# Patient Record
Sex: FEMALE | Race: WHITE
[De-identification: names, ages, dates, MRNs, and addresses within clinical notes are randomized per-mention and may not be internally consistent; named-entity substitution may affect disease eponyms.]

---

## 2018-03-11 NOTE — PD
HPI


Chief Complaint:  General Weakness


Time Seen by Provider:  14:46


Travel History


International Travel<30 days:  No


Contact w/Intl Traveler<30days:  No


Traveled to known affect area:  No





History of Present Illness


HPI


58 YO F with PMH of arrhythmia presents to the ED for evaluation of near 

syncopal episode ~2 hours before presentation. The patient states that she was 

walking, carrying a few small bonsai trees, when she felt dizzy, saw spots and 

"blacked out." A friend was with her, she did not fall or lose consciousness. 

ON presentation she denies headache, vision changes, CP, palpitations, SOB, 

abdominal pain, N/V, weakness of the extremities. She states that she felt as 

if her heart rate was "a little fast" this AM, so she took 2 doses of atenolol. 

States that she was recently treated for a UTI. She lives in [place] and 

is visiting for [place].





PFSH


Past Medical History


Arthritis:  Yes (RA)


Anxiety:  Yes


Heart Rhythm Problems:  Yes (TACHYCARDIA)


Cardiac Catheterization:  Yes


Cardiovascular Problems:  Yes (OCCASIONAL "RE-ENTRY TACHYCARDIA"; SAKSHI-

PARKINSON-WHITE SYNDROME)


Chest Pain:  Yes


Diminished Hearing:  No


Gastrointestinal Disorders:  Yes (FATTY LIVER)


Genitourinary:  Yes (PYELONEPHRITIS)


Musculoskeletal:  Yes (MS AND RA)


Psychiatric:  Yes (schizophrenia)


Pneumonia:  Yes


Schizophrenia:  Yes


Menopausal:  Yes


:  8


Para:  6


Miscarriage:  2





Past Surgical History


Appendectomy:  Yes


 Section:  Yes


Gynecologic Surgery:  Yes (C/SECTION X 1)





Social History


Alcohol Use:  No


Tobacco Use:  No


Substance Use:  No





Allergies-Medications


(Allergen,Severity, Reaction):  


Coded Allergies:  


     Sulfa (Sulfonamide Antibiotics) (Unverified  Allergy, Severe, 3/11/18)


 NAUSEA/VOMITING/SOB


     iodine (Unverified  Allergy, Severe, Swelling, 3/11/18)


 SOB


     potassium iodide (Unverified  Allergy, Severe, Swelling, 3/11/18)


 SOB


     povidone-iodine (Unverified  Allergy, Severe, Swelling, 3/11/18)


 SOB


     sodium iodide (Unverified  Allergy, Severe, Swelling, 3/11/18)


 SOB


     sodium iodide (Unverified  Allergy, Severe, Swelling, 3/11/18)


 SOB


Reported Meds & Prescriptions





Reported Meds & Active Scripts


Active


Macrobid (Nitrofurantoin Monoh/Nitrofur Macro) 100 Mg Cap 100 Mg PO BID 7 Days


Reported


Atenolol 25 Mg Tab 25 Mg PO DAILY


Seroquel (Quetiapine Fumarate) 25 Mg Tab 25 Mg PO HS


Paxil (Paroxetine HCl) 10 Mg Tab 20 Mg PO DAILY


Ritalin IR (Methylphenidate HCl) 10 Mg Tab 10 Mg PO BIDAC


Alprazolam 1 Mg Tab 1 Mg PO TID PRN


Carisoprodol 250 Mg Tab 350 Mg PO BID PRN


Oxycodone-Acetaminophen  mg Tab 1 Tab PO Q6H PRN








Review of Systems


Except as stated in HPI:  all other systems reviewed are Neg





Physical Exam


Narrative


GENERAL: Well-nourished, well-developed pleasant white female in no acute 

distress.


SKIN: Focused skin assessment warm/dry.


HEAD: Normocephalic.


EYES: No scleral icterus. No injection or drainage.  PERRLA.  EOMI.


NECK: Supple, trachea midline. No JVD or lymphadenopathy.


CARDIOVASCULAR: Regular rate and rhythm without murmurs, gallops, or rubs. 


RESPIRATORY: Breath sounds clear and equal bilaterally. No accessory muscle use.


GASTROINTESTINAL: Abdomen soft, non-tender, nondistended.  Active bowel sounds.


MUSCULOSKELETAL: No cyanosis, or edema. 


NEUROLOGICAL: Awake and alert. Cranial nerves II through XII intact.  Motor and 

sensory grossly within normal limits. Five out of 5 muscle strength in all 

muscle groups.  Normal speech.


BACK: Nontender without obvious deformity. No CVA tenderness.





Data


Data


Last Documented VS





Vital Signs








  Date Time  Temp Pulse Resp B/P (MAP) Pulse Ox O2 Delivery O2 Flow Rate FiO2


 


3/11/18 16:57  95 20 110/62 (78) 97 Room Air  


 


3/11/18 14:21 99.0       








Orders





 Orders


Electrocardiogram (3/11/18 14:46)


Complete Blood Count With Diff (3/11/18 14:46)


Comprehensive Metabolic Panel (3/11/18 14:46)


Ckmb (Isoenzyme) Profile (3/11/18 14:46)


Troponin I (3/11/18 14:46)


Act Partial Throm Time (Ptt) (3/11/18 14:46)


Prothrombin Time / Inr (Pt) (3/11/18 14:46)


Urinalysis - C+S If Indicated (3/11/18 14:46)


Ct Brain W/O Iv Contrast(Rout) (3/11/18 14:46)


Ecg Monitoring (3/11/18 14:46)


Iv Access Insert/Monitor (3/11/18 14:46)


Oximetry (3/11/18 14:46)


Sodium Chloride 0.9% Flush (Ns Flush) (3/11/18 15:00)


Sodium Chlor 0.9% 1000 Ml Inj (Ns 1000 M (3/11/18 14:46)


Potassium Chloride (Kcl) (3/11/18 16:30)


Cath For Specimen (3/11/18 17:07)


Urine Culture (3/11/18 15:50)


Nitrofurantoin Monohyd Macrocr (Macrobid (3/11/18 19:30)


Ed Discharge Order (3/11/18 19:17)





Labs





Laboratory Tests








Test


  3/11/18


15:20 3/11/18


15:50


 


White Blood Count 5.7 TH/MM3  


 


Red Blood Count 3.59 MIL/MM3  


 


Hemoglobin 10.9 GM/DL  


 


Hematocrit 31.4 %  


 


Mean Corpuscular Volume 87.3 FL  


 


Mean Corpuscular Hemoglobin 30.4 PG  


 


Mean Corpuscular Hemoglobin


Concent 34.9 % 


  


 


 


Red Cell Distribution Width 13.4 %  


 


Platelet Count 154 TH/MM3  


 


Mean Platelet Volume 8.6 FL  


 


Neutrophils (%) (Auto) 42.2 %  


 


Lymphocytes (%) (Auto) 48.5 %  


 


Monocytes (%) (Auto) 8.3 %  


 


Eosinophils (%) (Auto) 0.8 %  


 


Basophils (%) (Auto) 0.2 %  


 


Neutrophils # (Auto) 2.4 TH/MM3  


 


Lymphocytes # (Auto) 2.8 TH/MM3  


 


Monocytes # (Auto) 0.5 TH/MM3  


 


Eosinophils # (Auto) 0.0 TH/MM3  


 


Basophils # (Auto) 0.0 TH/MM3  


 


CBC Comment DIFF FINAL  


 


Differential Comment   


 


Prothrombin Time 10.3 SEC  


 


Prothromb Time International


Ratio 1.0 RATIO 


  


 


 


Activated Partial


Thromboplast Time 25.1 SEC 


  


 


 


Blood Urea Nitrogen 7 MG/DL  


 


Creatinine 1.07 MG/DL  


 


Random Glucose 85 MG/DL  


 


Total Protein 7.4 GM/DL  


 


Albumin 3.6 GM/DL  


 


Calcium Level 8.3 MG/DL  


 


Alkaline Phosphatase 75 U/L  


 


Aspartate Amino Transf


(AST/SGOT) 17 U/L 


  


 


 


Alanine Aminotransferase


(ALT/SGPT) 24 U/L 


  


 


 


Total Bilirubin 0.2 MG/DL  


 


Sodium Level 141 MEQ/L  


 


Potassium Level 2.8 MEQ/L  


 


Chloride Level 105 MEQ/L  


 


Carbon Dioxide Level 28.6 MEQ/L  


 


Anion Gap 7 MEQ/L  


 


Estimat Glomerular Filtration


Rate 53 ML/MIN 


  


 


 


Total Creatine Kinase 96 U/L  


 


Troponin I


  LESS THAN 0.02


NG/ML 


 


 


Urine Color  LIGHT-YELLOW 


 


Urine Turbidity  HAZY 


 


Urine pH  7.0 


 


Urine Specific Gravity  1.005 


 


Urine Protein  NEG mg/dL 


 


Urine Glucose (UA)  NEG mg/dL 


 


Urine Ketones  NEG mg/dL 


 


Urine Occult Blood  TRACE 


 


Urine Nitrite  NEG 


 


Urine Bilirubin  NEG 


 


Urine Urobilinogen


  


  LESS THAN 2.0


MG/DL


 


Urine Leukocyte Esterase  LARGE 


 


Urine RBC  20 /hpf 


 


Urine WBC  11 /hpf 


 


Urine Squamous Epithelial


Cells 


  13 /hpf 


 


 


Urine Bacteria  FEW /hpf 


 


Urine Mucus  FEW /lpf 


 


Microscopic Urinalysis Comment


  


  CULTURE


INDICATED











MDM


Medical Decision Making


Medical Screen Exam Complete:  Yes


Emergency Medical Condition:  Yes


Differential Diagnosis


Hypotension versus dehydration versus metabolic derangement versus less likely 

ACS versus less likely ICH versus other


Narrative Course


58 YO F with PMH of arrhythmia presents to the ED for evaluation of near 

syncopal episode ~2 hours before presentation. On presentation she denies 

headache, vision changes, CP, palpitations, SOB, abdominal pain, N/V, weakness 

of the extremities. She states that she felt as if her heart rate was "a little 

fast" this AM, so she took 2 doses of atenolol. States that she was recently 

treated for a UTI. She lives in [place] and is visiting for [place]ike [place].  

Temp 99, pulse 91, blood pressure 80/46 on presentation.  On exam this is a 

nontoxic-appearing female in no acute distress.  No focal neuro deficits.  No 

appreciable M/R/G.  Chest CTA B.  Abdomen soft and nontender.  No lower 

extremity edema.  IV was established.  Patient was administered 1 L normal 

saline.





EKG rate 81, sinus rhythm.  ND interval 152, QRS 93, QTc 441 ms.  Normal axis.  

Incomplete RBBB.  No acute ST changes.  Reviewed by Dr. Simpson.


Cardiac enzymes negative 1.


CBC: WBC 5.7, hemoglobin 10.9.


CMP: Potassium 2.8.  BUN 7, creatinine 1.07.  Calcium 8.3.


UA: Hazy, trace occult blood, large leukocyte esterase, 11 WBCs, few bacteria.  

Culture pending.


Head CT: No acute intracranial abnormalities.  Right maxillary sinusitis.





The patient was administered 40 mEq of potassium chloride by mouth.  On recheck 

she reports complete resolution of her symptoms.  BP improved to 110/62.  She 

is prescribed 100 mg Macrobid twice a day 7 days, first dose administered in 

the ED.  She is given strict instructions to take her medications only as 

prescribed, follow with her primary care provider, return for worsening 

symptoms.  She indicated understanding of the instructions and is agreeable to 

the care plan.  She is stable and discharged home.





Diagnosis





 Primary Impression:  


 Hypotension due to drugs


 Additional Impressions:  


 Hypokalemia


 Urinary tract infection


 Qualified Codes:  N39.0 - Urinary tract infection, site not specified


Referrals:  


Primary Care Physician


Patient Instructions:  General Instructions, Hypokalemia (ED), Hypotension (ED)





***Additional Instructions:  


Rest, hydrate.


Resume at-home medications as they are prescribed.


Begin antibiotics today and take them until every pill is gone.


Do not double up on blood pressure medications without discussing with your 

physician.


Eat a potassium rich, balanced diet.


Follow up with your primary care provider. 


Return to the ED for worsening symptoms or any urgent or emergent medical 

condition.


Scripts


Nitrofurantoin Monohydrate Macrocrystals (Macrobid) 100 Mg Cap


100 MG PO BID for Infection for 7 Days, #14 CAP 0 Refills


   Prov: Rolly Simpson MD         3/11/18


Disposition:  01 DISCHARGE HOME


Condition:  Stable











Funmi Roach Mar 11, 2018 14:49

## 2018-03-12 NOTE — EKG
Date Performed: 03/11/2018       Time Performed: 15:21:00

 

PTAGE:      57 years

 

EKG:      Sinus rhythm 

 

 POSSIBLE RIGHT VENTRICULAR CONDUCTION DELAY BORDERLINE ECG

 

PREVIOUS TRACING       : 02/08/2007 08.34 Since the prior tracing, there has been no significant west


 

DOCTOR:   Rachel Pruett  Interpretating Date/Time  03/12/2018 18:30:58

## 2018-05-24 ENCOUNTER — HOSPITAL ENCOUNTER (INPATIENT)
Dept: HOSPITAL 17 - NEPD | Age: 58
LOS: 20 days | Discharge: HOME | DRG: 885 | End: 2018-06-13
Attending: PSYCHIATRY & NEUROLOGY | Admitting: PSYCHIATRY & NEUROLOGY
Payer: MEDICAID

## 2018-05-24 VITALS
OXYGEN SATURATION: 99 % | DIASTOLIC BLOOD PRESSURE: 64 MMHG | RESPIRATION RATE: 16 BRPM | SYSTOLIC BLOOD PRESSURE: 129 MMHG | HEART RATE: 110 BPM

## 2018-05-24 VITALS
RESPIRATION RATE: 18 BRPM | DIASTOLIC BLOOD PRESSURE: 81 MMHG | SYSTOLIC BLOOD PRESSURE: 117 MMHG | HEART RATE: 131 BPM | OXYGEN SATURATION: 98 % | TEMPERATURE: 97.9 F

## 2018-05-24 VITALS
TEMPERATURE: 98.9 F | DIASTOLIC BLOOD PRESSURE: 87 MMHG | RESPIRATION RATE: 18 BRPM | HEART RATE: 140 BPM | SYSTOLIC BLOOD PRESSURE: 143 MMHG

## 2018-05-24 VITALS — BODY MASS INDEX: 22.3 KG/M2 | WEIGHT: 133.82 LBS | HEIGHT: 65 IN

## 2018-05-24 VITALS
DIASTOLIC BLOOD PRESSURE: 67 MMHG | TEMPERATURE: 98.9 F | OXYGEN SATURATION: 100 % | SYSTOLIC BLOOD PRESSURE: 126 MMHG | HEART RATE: 129 BPM | RESPIRATION RATE: 16 BRPM

## 2018-05-24 DIAGNOSIS — F11.10: ICD-10-CM

## 2018-05-24 DIAGNOSIS — Z91.14: ICD-10-CM

## 2018-05-24 DIAGNOSIS — M06.9: ICD-10-CM

## 2018-05-24 DIAGNOSIS — E86.0: ICD-10-CM

## 2018-05-24 DIAGNOSIS — G35: ICD-10-CM

## 2018-05-24 DIAGNOSIS — R55: ICD-10-CM

## 2018-05-24 DIAGNOSIS — Z88.2: ICD-10-CM

## 2018-05-24 DIAGNOSIS — F50.89: ICD-10-CM

## 2018-05-24 DIAGNOSIS — F41.1: ICD-10-CM

## 2018-05-24 DIAGNOSIS — F17.210: ICD-10-CM

## 2018-05-24 DIAGNOSIS — F20.0: Primary | ICD-10-CM

## 2018-05-24 DIAGNOSIS — I45.6: ICD-10-CM

## 2018-05-24 DIAGNOSIS — K76.0: ICD-10-CM

## 2018-05-24 DIAGNOSIS — I95.9: ICD-10-CM

## 2018-05-24 DIAGNOSIS — Z91.19: ICD-10-CM

## 2018-05-24 DIAGNOSIS — Z81.8: ICD-10-CM

## 2018-05-24 LAB
ALBUMIN SERPL-MCNC: 3.9 GM/DL (ref 3.4–5)
ALP SERPL-CCNC: 89 U/L (ref 45–117)
ALT SERPL-CCNC: 21 U/L (ref 10–53)
AMORPHOUS SEDIMENT, URINE: (no result)
AST SERPL-CCNC: 22 U/L (ref 15–37)
BACTERIA #/AREA URNS HPF: (no result) /HPF
BASOPHILS # BLD AUTO: 0 TH/MM3 (ref 0–0.2)
BASOPHILS NFR BLD: 0.5 % (ref 0–2)
BILIRUB SERPL-MCNC: 0.7 MG/DL (ref 0.2–1)
BUN SERPL-MCNC: 13 MG/DL (ref 7–18)
CALCIUM SERPL-MCNC: 9.7 MG/DL (ref 8.5–10.1)
CHLORIDE SERPL-SCNC: 104 MEQ/L (ref 98–107)
COLOR UR: YELLOW
CREAT SERPL-MCNC: 1.12 MG/DL (ref 0.5–1)
EOSINOPHIL # BLD: 0 TH/MM3 (ref 0–0.4)
EOSINOPHIL NFR BLD: 0.1 % (ref 0–4)
ERYTHROCYTE [DISTWIDTH] IN BLOOD BY AUTOMATED COUNT: 14.6 % (ref 11.6–17.2)
GFR SERPLBLD BASED ON 1.73 SQ M-ARVRAT: 50 ML/MIN (ref 89–?)
GLUCOSE SERPL-MCNC: 119 MG/DL (ref 74–106)
GLUCOSE UR STRIP-MCNC: (no result) MG/DL
HCO3 BLD-SCNC: 23.3 MEQ/L (ref 21–32)
HCT VFR BLD CALC: 40.3 % (ref 35–46)
HGB BLD-MCNC: 13.3 GM/DL (ref 11.6–15.3)
HGB UR QL STRIP: (no result)
KETONES UR STRIP-MCNC: 10 MG/DL
LYMPHOCYTES # BLD AUTO: 1.9 TH/MM3 (ref 1–4.8)
LYMPHOCYTES NFR BLD AUTO: 25.1 % (ref 9–44)
MCH RBC QN AUTO: 29 PG (ref 27–34)
MCHC RBC AUTO-ENTMCNC: 33 % (ref 32–36)
MCV RBC AUTO: 87.8 FL (ref 80–100)
MONOCYTE #: 0.5 TH/MM3 (ref 0–0.9)
MONOCYTES NFR BLD: 6.3 % (ref 0–8)
MUCOUS THREADS #/AREA URNS LPF: (no result) /LPF
NEUTROPHILS # BLD AUTO: 5.1 TH/MM3 (ref 1.8–7.7)
NEUTROPHILS NFR BLD AUTO: 68 % (ref 16–70)
NITRITE UR QL STRIP: (no result)
PLATELET # BLD: 280 TH/MM3 (ref 150–450)
PMV BLD AUTO: 8.8 FL (ref 7–11)
PROT SERPL-MCNC: 9.2 GM/DL (ref 6.4–8.2)
RBC # BLD AUTO: 4.59 MIL/MM3 (ref 4–5.3)
SODIUM SERPL-SCNC: 140 MEQ/L (ref 136–145)
SP GR UR STRIP: 1.02 (ref 1–1.03)
SQUAMOUS #/AREA URNS HPF: 2 /HPF (ref 0–5)
T3FREE SERPL-MCNC: 2.67 PG/ML (ref 2.18–3.98)
T4 FREE SERPL-MCNC: 0.87 NG/DL (ref 0.76–1.46)
TRANS CELLS #/AREA URNS HPF: 1 /HPF
TROPONIN I SERPL-MCNC: (no result) NG/ML (ref 0.02–0.05)
URINE LEUKOCYTE ESTERASE: (no result)
WBC # BLD AUTO: 7.5 TH/MM3 (ref 4–11)

## 2018-05-24 PROCEDURE — 84484 ASSAY OF TROPONIN QUANT: CPT

## 2018-05-24 PROCEDURE — 80156 ASSAY CARBAMAZEPINE TOTAL: CPT

## 2018-05-24 PROCEDURE — 84439 ASSAY OF FREE THYROXINE: CPT

## 2018-05-24 PROCEDURE — 80307 DRUG TEST PRSMV CHEM ANLYZR: CPT

## 2018-05-24 PROCEDURE — 93005 ELECTROCARDIOGRAM TRACING: CPT

## 2018-05-24 PROCEDURE — 70450 CT HEAD/BRAIN W/O DYE: CPT

## 2018-05-24 PROCEDURE — 84481 FREE ASSAY (FT-3): CPT

## 2018-05-24 PROCEDURE — 80061 LIPID PANEL: CPT

## 2018-05-24 PROCEDURE — 85025 COMPLETE CBC W/AUTO DIFF WBC: CPT

## 2018-05-24 PROCEDURE — 83735 ASSAY OF MAGNESIUM: CPT

## 2018-05-24 PROCEDURE — 82550 ASSAY OF CK (CPK): CPT

## 2018-05-24 PROCEDURE — 84443 ASSAY THYROID STIM HORMONE: CPT

## 2018-05-24 PROCEDURE — 96360 HYDRATION IV INFUSION INIT: CPT

## 2018-05-24 PROCEDURE — 80053 COMPREHEN METABOLIC PANEL: CPT

## 2018-05-24 PROCEDURE — 96361 HYDRATE IV INFUSION ADD-ON: CPT

## 2018-05-24 PROCEDURE — 83036 HEMOGLOBIN GLYCOSYLATED A1C: CPT

## 2018-05-24 PROCEDURE — 81001 URINALYSIS AUTO W/SCOPE: CPT

## 2018-05-24 PROCEDURE — 82948 REAGENT STRIP/BLOOD GLUCOSE: CPT

## 2018-05-24 PROCEDURE — 84100 ASSAY OF PHOSPHORUS: CPT

## 2018-05-24 NOTE — PD
__________________________________________________





History of Present Illness


Chief Complaint:  Psychiatric Symptoms


Time Seen by Provider:  18:30


Travel History


International Travel<30 Days:  No


Contact w/Intl Traveler<30days:  No


Known affected area:  No





Legal Status


Legal Status:  B[place] Act


History of Present Illness:


This is a 58-year-old  female who presents to this emergency department 

under Baker act.  The C[place]rena act reports that she has been diagnosed with 

schizophrenia and depression according to her sister.  Sister additionally 

reported that she has not eaten or slept in a week.  Patient has not previously 

been seen at this facility for psychiatric admissions.





Reviewed electronic medical record, labs, discuss case with staff.  Patient was 

evaluated in her room in the emergency department.  She is awake and alert 

unable to determine her orientation.  Patient exhibits perseveration and her 

speech.  She refuses to answer questions and reports, "I am praying".  When I 

attempted to explain why was there and that she would possibly be admitted to 

inpatient psych she responded "will go ahead" and continued with her 

perseveration.  Staff reported that this is the same behavior she has been 

exhibiting throughout her visit here thus far.





Contacted the patient's daughter Telma Holloway at [phone].  She placed the 

call for the original Camarena act.  She reports that her mother lives in the 

[facility] area and gets Camarena acted multiple times per year.  She states 

typically she is in treated at [facility] or [facility].  She reports that 

her mother has had long-term schizophrenia and was typically Camarena acted twice 

a year however after her  went to custodial and her 2 children one off to 

the [employer] she started having increasing psychoses.  The daughter reports that 

she brought her mother to live with her however her mother is not happy there.  

She describes her mother as a "wild child" and says that she likes to go to 

bars meet guys but does not drink alcohol.  When asked if she is compliant with 

her medications the daughter states, "she is over compliant with the one she 

likes".  She states that she has a history of overuse of her pain medications.  

She reports that her mother went to custodial in  for theft from [place] while 

she was there both of the patient's parents  and the daughter feels this 

caused her condition to worsen.  She reports that her mother has lost weight 

recently and tells her that "I am the chosen one so I do not eat, sleep, or 

drink.  Her daughter describes hyperreligiosity stating that her mother 

believes that her children have been murdered and reincarnated by the devil.  

She also reports that her mother has become physically irresponsible and states 

that she has been buying a new cell phone every day for 7 days because she 

believes that somebody is tracking her.  Patient's daughter had expressed 

concern to her mother that she was not eating or sleeping.  She reports that 

her mother then left to visit her sister who lives in this area.  The patient's 

sister called the daughter today stating that the patient was "out of control 

it is the worst I have seen her yet".  Patient's daughter expresses interest in 

possibly getting her on a long-acting injectable.  She advises that her mother'

s  will be getting out of custodial soon and will be looking for a place to 

stay together.





PFSH


Past Medical History


Arthritis:  Yes (RA)


Anxiety:  Yes


Depression:  Yes (manic )


Heart Rhythm Problems:  Yes (TACHYCARDIA)


Cardiac Catheterization:  Yes


Cardiovascular Problems:  Yes (OCCASIONAL "RE-ENTRY TACHYCARDIA"; SAKSHI-

PARKINSON-WHITE SYNDROME)


Chest Pain:  Yes


Diabetes:  No


Diminished Hearing:  No


Gastrointestinal Disorders:  Yes (FATTY LIVER)


Genitourinary:  Yes (PYELONEPHRITIS)


Musculoskeletal:  Yes (MS AND RA)


Psychiatric:  Yes (schizophrenia)


Immunizations Current:  Yes


Pneumonia:  Yes


Schizophrenia:  Yes


Tetanus Vaccination:  Unknown


Influenza Vaccination:  No


Pregnant?:  Not Pregnant


Menopausal:  Yes


:  8


Para:  6


Miscarriage:  2





Past Surgical History


Appendectomy:  Yes


 Section:  Yes


Gynecologic Surgery:  Yes (C/SECTION X 1)





Psychiatric History


Psychiatric History


Schizophrenia and bipolar.  Daughter reports multiple inpatient admissions with 

"several Camarena acts per year".  Patient has typically been treated in the [facility] area.


History of Inpatient Treatment:  Yes





Social History


The daughter denies that her mother drinks alcohol or uses illegal substances.  

She was incarcerated for theft in .  Has been residing with her daughter 

since her  went to custodial.


Hx Alcohol Use:  No


Hx Tobacco Use:  No


Hx Substance Use:  No


Hx of Substance Use Treatment:  No





Allergies-Medications


(Allergen,Severity, Reaction):  


Coded Allergies:  


     Sulfa (Sulfonamide Antibiotics) (Unverified  Allergy, Severe, 3/11/18)


 NAUSEA/VOMITING/SOB


     haloperidol (Verified  Allergy, Severe, 18)


     iodine (Unverified  Allergy, Severe, Swelling, 3/11/18)


 SOB


     potassium iodide (Unverified  Allergy, Severe, Swelling, 3/11/18)


 SOB


     povidone-iodine (Unverified  Allergy, Severe, Swelling, 3/11/18)


 SOB


     sodium iodide (Unverified  Allergy, Severe, Swelling, 3/11/18)


 SOB


     sodium iodide (Unverified  Allergy, Severe, Swelling, 3/11/18)


 SOB


Reported Meds & Prescriptions





Reported Meds & Active Scripts


Active


Macrobid (Nitrofurantoin Monoh/Nitrofur Macro) 100 Mg Cap 100 Mg PO BID 7 Days


Reported


Atenolol 25 Mg Tab 25 Mg PO DAILY


Seroquel (Quetiapine Fumarate) 25 Mg Tab 25 Mg PO HS


Paxil (Paroxetine HCl) 10 Mg Tab 20 Mg PO DAILY


Ritalin IR (Methylphenidate HCl) 10 Mg Tab 10 Mg PO BIDAC


Alprazolam 1 Mg Tab 1 Mg PO TID PRN


Carisoprodol 250 Mg Tab 350 Mg PO BID PRN


Oxycodone-Acetaminophen  mg Tab 1 Tab PO Q6H PRN





Review of Systems


ROS Limitations:  Psychotic





Mental Status Examination


Appearance:  Disheveled


Consciousness:  Alert


Speech:  Rapid


Language:  Perseveration


Fund of Knowledge:  Inadequate


Attention and Concentration:  Inadequate


Mood:  Manic


Affect:  Irritable, Anxious


Thought Content:  Ideas of reference, Preoccupations, Delusional


Insight:  Poor


Judgment:  Poor





MDM


Medical Decision Making


Medical Record Reviewed:  Yes


Assessment/Plan


This is a 58-year-old ,  female is brought in under Camarena act 

for bizarre behavior and not eating or sleeping for the past week.  Upon 

examination patient is perseverating and refuses to answer questions.  When 

asked a question she responded, "I am praying".  She then continued to 

perseverate saying, "ta ta ta ta".  When told that she needed to be evaluated 

for possible inpatient admission she stated, "I do not care admit me,ta ta ta ta

" collateral information from the patient's daughters that she has a long 

history of schizophrenia and bipolar disorder.  Patient has been treated 

inpatient multiple occasions in the [facility].  She reports that her 

mother has lost weight recently and is displaying bizarre behavior psychosis.  

Due to the collateral information, patient's refusal to eat as well as her 

behavior she will be admitted to the [facility] unit for further evaluation and 

treatment as necessary.  Her daughter has consented to act as her medical 

surrogate.  She also has consented to have patient's current psychotropics 

administered to her.  She would like to explore the possibility of a long-

acting injectable.





Orders





 Orders


Complete Blood Count With Diff (18 15:14)


Comprehensive Metabolic Panel (18 15:14)


Thyroid Stimulating Hormone (18 15:14)


Urinalysis - C+S If Indicated (18 15:14)


Psych Screen (18 15:14)


Drug Screen, Random Urine (18 15:14)


Alcohol (Ethanol) (18 15:14)


Creatine Kinase (Cpk) (18 15:51)


Free T3 (18 15:58)


Free Thyroxine (T4) (18 15:58)


Electrocardiogram (18 )


Troponin I (18 15:58)


Multivitamin (Theragran) (18 16:45)


Sodium Chlor 0.9% 1000 Ml Inj (Ns 1000 M (18 16:45)


Admit Order (Ed Use Only) (18 18:54)


Admit To Inpatient Psych (18 )


Code Status (18 18:55)


Vital Signs (Adult) [id number] (18 18:55)


Activity Oob Ad Karin (18 18:55)


Level Of Observation (Psych) (18 18:55)


Diet Regular Basic (18 Dinner)


Acetaminophen (Tylenol) (18 19:00)


Magnesium Hydroxide Liq (Milk Of Magnesi (18 19:00)


Al-Mag Hy-Si 40-40-4 Mg/Ml Liq (Mag-Al P (18 19:00)


Basic Metabolic Panel (Bmp) (18 06:00)


Lipid Profile (18 06:00)


Hemoglobin (Hgb) A1c (18 06:00)





Results





Vital Signs








  Date Time  Temp Pulse Resp B/P (MAP) Pulse Ox O2 Delivery O2 Flow Rate FiO2


 


18 17:24  110 16 129/64 (85) 99 Room Air  


 


18 15:55 98.9 129 16 126/67 (86) 100   


 


18 15:38 98.9 140 18 143/87 (105)    











Laboratory Tests








Test


  18


15:52 18


18:05


 


White Blood Count 7.5  


 


Red Blood Count 4.59  


 


Hemoglobin 13.3  


 


Hematocrit 40.3  


 


Mean Corpuscular Volume 87.8  


 


Mean Corpuscular Hemoglobin 29.0  


 


Mean Corpuscular Hemoglobin


Concent 33.0 


  


 


 


Red Cell Distribution Width 14.6  


 


Platelet Count 280  


 


Mean Platelet Volume 8.8  


 


Neutrophils (%) (Auto) 68.0  


 


Lymphocytes (%) (Auto) 25.1  


 


Monocytes (%) (Auto) 6.3  


 


Eosinophils (%) (Auto) 0.1  


 


Basophils (%) (Auto) 0.5  


 


Neutrophils # (Auto) 5.1  


 


Lymphocytes # (Auto) 1.9  


 


Monocytes # (Auto) 0.5  


 


Eosinophils # (Auto) 0.0  


 


Basophils # (Auto) 0.0  


 


CBC Comment DIFF FINAL  


 


Differential Comment   


 


Blood Urea Nitrogen 13  


 


Creatinine 1.12  


 


Random Glucose 119  


 


Total Protein 9.2  


 


Albumin 3.9  


 


Calcium Level 9.7  


 


Alkaline Phosphatase 89  


 


Aspartate Amino Transf


(AST/SGOT) 22 


  


 


 


Alanine Aminotransferase


(ALT/SGPT) 21 


  


 


 


Total Bilirubin 0.7  


 


Sodium Level 140  


 


Potassium Level 3.9  


 


Chloride Level 104  


 


Carbon Dioxide Level 23.3  


 


Anion Gap 13  


 


Estimat Glomerular Filtration


Rate 50 


  


 


 


Total Creatine Kinase 40  


 


Troponin I LESS THAN 0.02  


 


Free Thyroxine 0.87  


 


Free Triiodothyronine (T3)


pg/dL 2.67 


  


 


 


Thyroid Stimulating Hormone


3rd Gen 0.874 


  


 


 


Ethyl Alcohol Level LESS THAN 3  


 


Urine Color  YELLOW 


 


Urine Turbidity  HAZY 


 


Urine pH  7.5 


 


Urine Specific Gravity  1.017 


 


Urine Protein  TRACE 


 


Urine Glucose (UA)  NEG 


 


Urine Ketones  10 


 


Urine Occult Blood  NEG 


 


Urine Nitrite  NEG 


 


Urine Bilirubin  NEG 


 


Urine Urobilinogen  2.0 


 


Urine Leukocyte Esterase  SMALL 


 


Urine RBC  1 


 


Urine WBC  7 


 


Urine Squamous Epithelial


Cells 


  2 


 


 


Urine Transitional Epithelial


Cells 


  1 


 


 


Urine Amorphous Sediment  OCC 


 


Urine Bacteria  RARE 


 


Urine Mucus  FEW 


 


Microscopic Urinalysis Comment


  


  CULT NOT


INDICATED


 


Urine Opiates Screen  NEG 


 


Urine Barbiturates Screen  NEG 


 


Urine Amphetamines Screen  NEG 


 


Urine Benzodiazepines Screen  POS 


 


Urine Cocaine Screen  NEG 


 


Urine Cannabinoids Screen  NEG 











Diagnosis





 Primary Impression:  


 Unspecified psychosis


 Additional Impressions:  


 Schizophrenia


 Bipolar disorder with severe leonard





Admitting Information


Admitting Physician Requests:  Admit





Problem Qualifiers











Arlene Toro May 24, 2018 19:13

## 2018-05-24 NOTE — PD
HPI


Chief Complaint:  Psychiatric Symptoms


Time Seen by Provider:  15:51


Travel History


International Travel<30 days:  No


Contact w/Intl Traveler<30days:  No


Traveled to known affect area:  No





History of Present Illness


HPI


Patient 58-year-old female presents emergency department under [place] act for 

psychiatric evaluation.  My initial evaluation the patient is only uttering 3 

apparently made up words and language I do not recognize.  [place]rena act states 

that she has been doing this at least all day today and she is not eating or 

drinking very well in the past week.  The patient on at least my initial 

evaluation is unable to or unwilling to provide any additional history.  She 

does continues to order these 3 vocalizations over and over again.  History is 

extremely limited by the patient's psychosis peer





PFS


Past Medical History


Arthritis:  Yes (RA)


Anxiety:  Yes


Heart Rhythm Problems:  Yes (TACHYCARDIA)


Cardiac Catheterization:  Yes


Cardiovascular Problems:  Yes (OCCASIONAL "RE-ENTRY TACHYCARDIA"; SAKSHI-

PARKINSON-WHITE SYNDROME)


Chest Pain:  Yes


Diabetes:  No


Diminished Hearing:  No


Gastrointestinal Disorders:  Yes (FATTY LIVER)


Genitourinary:  Yes (PYELONEPHRITIS)


Musculoskeletal:  Yes (MS AND RA)


Psychiatric:  Yes (schizophrenia)


Pneumonia:  Yes


Schizophrenia:  Yes


Pregnant?:  Not Pregnant


Menopausal:  Yes


:  8


Para:  6


Miscarriage:  2





Past Surgical History


Appendectomy:  Yes


 Section:  Yes


Gynecologic Surgery:  Yes (C/SECTION X 1)





Social History


Alcohol Use:  No


Tobacco Use:  No


Substance Use:  No





Allergies-Medications


(Allergen,Severity, Reaction):  


Coded Allergies:  


     Sulfa (Sulfonamide Antibiotics) (Unverified  Allergy, Severe, 3/11/18)


 NAUSEA/VOMITING/SOB


     iodine (Unverified  Allergy, Severe, Swelling, 3/11/18)


 SOB


     potassium iodide (Unverified  Allergy, Severe, Swelling, 3/11/18)


 SOB


     povidone-iodine (Unverified  Allergy, Severe, Swelling, 3/11/18)


 SOB


     sodium iodide (Unverified  Allergy, Severe, Swelling, 3/11/18)


 SOB


     sodium iodide (Unverified  Allergy, Severe, Swelling, 3/11/18)


 SOB


Reported Meds & Prescriptions





Reported Meds & Active Scripts


Active


Macrobid (Nitrofurantoin Monoh/Nitrofur Macro) 100 Mg Cap 100 Mg PO BID 7 Days


Reported


Atenolol 25 Mg Tab 25 Mg PO DAILY


Seroquel (Quetiapine Fumarate) 25 Mg Tab 25 Mg PO HS


Paxil (Paroxetine HCl) 10 Mg Tab 20 Mg PO DAILY


Ritalin IR (Methylphenidate HCl) 10 Mg Tab 10 Mg PO BIDAC


Alprazolam 1 Mg Tab 1 Mg PO TID PRN


Carisoprodol 250 Mg Tab 350 Mg PO BID PRN


Oxycodone-Acetaminophen  mg Tab 1 Tab PO Q6H PRN








Review of Systems


Except as stated in HPI:  all other systems reviewed are Neg





Physical Exam


Narrative


GENERAL: Well-developed well-nourished no obvious distress


SKIN: Focused skin assessment warm/dry.


HEAD: Atraumatic. Normocephalic. 


EYES: Pupils equal and round. No scleral icterus. No injection or drainage. 


ENT: No nasal bleeding or discharge.  Mucous membranes pink and moist.


NECK: Trachea midline. No JVD. 


CARDIOVASCULAR: Regular rhythm with tachycardia.  No murmur appreciated.


RESPIRATORY: No accessory muscle use. Clear to auscultation. Breath sounds 

equal bilaterally. 


GASTROINTESTINAL: Abdomen soft, non-tender, nondistended. Hepatic and splenic 

margins not palpable. 


MUSCULOSKELETAL: No obvious deformities. No clubbing.  No cyanosis.  No edema. 


NEUROLOGICAL: Awake and alert. No obvious cranial nerve deficits.  Motor 

grossly within normal limits. Normal speech.


PSYCHIATRIC: Difficult to assess, the patient ordering 3 words over and over 

again, later my evaluation the patient is speaking in English states that she 

has to urinate, she also refused any additional care after a liter of fluid.  

She was unwilling to discuss any psychiatric illness with me, she would not 

answer me about her suicidal homicidal ideation or audiovisual hallucinations.





Data


Data


Last Documented VS





Vital Signs








  Date Time  Temp Pulse Resp B/P (MAP) Pulse Ox O2 Delivery O2 Flow Rate FiO2


 


18 17:24  110 16 129/64 (85) 99 Room Air  


 


18 15:55 98.9       








Orders





 Orders


Complete Blood Count With Diff (18 15:14)


Comprehensive Metabolic Panel (18 15:14)


Thyroid Stimulating Hormone (18 15:14)


Urinalysis - C+S If Indicated (18 15:14)


Psych Screen (18 15:14)


Drug Screen, Random Urine (18 15:14)


Alcohol (Ethanol) (18 15:14)


Creatine Kinase (Cpk) (18 15:51)


Free T3 (18 15:58)


Free Thyroxine (T4) (18 15:58)


Electrocardiogram (18 )


Troponin I (18 15:58)


Multivitamin (Theragran) (18 16:45)


Sodium Chlor 0.9% 1000 Ml Inj (Ns 1000 M (18 16:45)





Labs





Laboratory Tests








Test


  18


15:52 18


18:05


 


White Blood Count 7.5 TH/MM3  


 


Red Blood Count 4.59 MIL/MM3  


 


Hemoglobin 13.3 GM/DL  


 


Hematocrit 40.3 %  


 


Mean Corpuscular Volume 87.8 FL  


 


Mean Corpuscular Hemoglobin 29.0 PG  


 


Mean Corpuscular Hemoglobin


Concent 33.0 % 


  


 


 


Red Cell Distribution Width 14.6 %  


 


Platelet Count 280 TH/MM3  


 


Mean Platelet Volume 8.8 FL  


 


Neutrophils (%) (Auto) 68.0 %  


 


Lymphocytes (%) (Auto) 25.1 %  


 


Monocytes (%) (Auto) 6.3 %  


 


Eosinophils (%) (Auto) 0.1 %  


 


Basophils (%) (Auto) 0.5 %  


 


Neutrophils # (Auto) 5.1 TH/MM3  


 


Lymphocytes # (Auto) 1.9 TH/MM3  


 


Monocytes # (Auto) 0.5 TH/MM3  


 


Eosinophils # (Auto) 0.0 TH/MM3  


 


Basophils # (Auto) 0.0 TH/MM3  


 


CBC Comment DIFF FINAL  


 


Differential Comment   


 


Blood Urea Nitrogen 13 MG/DL  


 


Creatinine 1.12 MG/DL  


 


Random Glucose 119 MG/DL  


 


Total Protein 9.2 GM/DL  


 


Albumin 3.9 GM/DL  


 


Calcium Level 9.7 MG/DL  


 


Alkaline Phosphatase 89 U/L  


 


Aspartate Amino Transf


(AST/SGOT) 22 U/L 


  


 


 


Alanine Aminotransferase


(ALT/SGPT) 21 U/L 


  


 


 


Total Bilirubin 0.7 MG/DL  


 


Sodium Level 140 MEQ/L  


 


Potassium Level 3.9 MEQ/L  


 


Chloride Level 104 MEQ/L  


 


Carbon Dioxide Level 23.3 MEQ/L  


 


Anion Gap 13 MEQ/L  


 


Estimat Glomerular Filtration


Rate 50 ML/MIN 


  


 


 


Total Creatine Kinase 40 U/L  


 


Troponin I


  LESS THAN 0.02


NG/ML 


 


 


Free Thyroxine 0.87 NG/DL  


 


Free Triiodothyronine (T3)


pg/dL 2.67 PG/ML 


  


 


 


Thyroid Stimulating Hormone


3rd Gen 0.874 uIU/ML 


  


 


 


Ethyl Alcohol Level


  LESS THAN 3


MG/DL 


 


 


Urine Color  YELLOW 


 


Urine Turbidity  HAZY 


 


Urine pH  7.5 


 


Urine Specific Gravity  1.017 


 


Urine Protein  TRACE mg/dL 


 


Urine Glucose (UA)  NEG mg/dL 


 


Urine Ketones  10 mg/dL 


 


Urine Occult Blood  NEG 


 


Urine Nitrite  NEG 


 


Urine Bilirubin  NEG 


 


Urine Urobilinogen  2.0 MG/DL 


 


Urine Leukocyte Esterase  SMALL 


 


Urine RBC  1 /hpf 


 


Urine WBC  7 /hpf 


 


Urine Squamous Epithelial


Cells 


  2 /hpf 


 


 


Urine Transitional Epithelial


Cells 


  1 /hpf 


 


 


Urine Amorphous Sediment  OCC 


 


Urine Bacteria  RARE /hpf 


 


Urine Mucus  FEW /lpf 


 


Microscopic Urinalysis Comment


  


  CULT NOT


INDICATED


 


Urine Opiates Screen  NEG 


 


Urine Barbiturates Screen  NEG 


 


Urine Amphetamines Screen  NEG 


 


Urine Benzodiazepines Screen  POS 


 


Urine Cocaine Screen  NEG 


 


Urine Cannabinoids Screen  NEG 











MDM


Medical Decision Making


Medical Screen Exam Complete:  Yes


Emergency Medical Condition:  Yes


Differential Diagnosis


Dehydration, rhabdomyolysis, acute kidney injury, psychosis, bipolar disorder.


Narrative Course


Patient room to the emergency department, IV access was obtained and her labs 

are actually reassuring.  She was tachycardic into the low 130s, after a liter 

fluid she has come down in the low 100s.  I had ordered an additional liter of 

fluid to her as she seem to be fluid responsive, at which time she ripped out 

her IV and stated that we could not do things again to her against her will.  

Vital signs otherwise are within normal limits.  I do not think that the 

patient warrants sedation and/or restraint to continue to IV hydrate.  She is 

medically cleared for psychiatric admission, the patient will need to be 

watched her I's and O's given the history of decreased p.o. intake.  She may 

require nutritionist consultation as well.  However I think this can be done 

from a psychiatric unit.  If there is any further concern the patient should be 

admitted/transferred to med psych.





Diagnosis





 Primary Impression:  


 Psychosis


 Additional Impression:  


 Mild dehydration





Admitting Information


Admitting Physician Requests:  Admit


Condition:  Jose Alfredo Larsen MD May 24, 2018 16:09

## 2018-05-25 RX ADMIN — ZIPRASIDONE HYDROCHLORIDE SCH MG: 40 CAPSULE ORAL at 17:18

## 2018-05-25 RX ADMIN — ZIPRASIDONE HYDROCHLORIDE SCH MG: 40 CAPSULE ORAL at 12:30

## 2018-05-25 RX ADMIN — ATENOLOL SCH MG: 25 TABLET ORAL at 08:59

## 2018-05-25 RX ADMIN — ZIPRASIDONE MESYLATE PRN MG: 20 INJECTION, POWDER, LYOPHILIZED, FOR SOLUTION INTRAMUSCULAR at 14:39

## 2018-05-25 NOTE — HHI.HP
Provisional Diagnosis


Admission Date


May 24, 2018 at 18:55


Axis I.


Schizophrenia chronic paranoid type F 20.0





                               Certification of Person's Competence 


                           To Provide Express and Informed Consent





I have personally examined Mary Grace Ratliff , a person being served at 

[facility] on, May 25, 2018 12:55.


Express and informed consent means consent voluntarily given in writing, by a 

competent person, after sufficient explanation and disclosure of the subject 

matter involved to enable the person to make a knowing and willful decision 

without any element of force, fraud, deceit, duress, or other form of 

constraint or coercion.





This person is 18 years of age or older, is not now known to be incompetent to 

consent to treatment with a guardian advocate, and does not have a health care 

surrogate or proxy currently making medical treatment decisions.  I have found 

this person to be one of the following:





[] Competent to provide express and informed consent, as defined above, for 

voluntary admission to this facility and is competent to provide express and 

informed consent for treatment.  He/she has the consistent capacity to make 

well reasoned, willful, and knowing decisions concerning his or her medical or 

mental health treatment.  The person fully and consistently understands the 

purpose of the admission for examination/placement and is fully capable of 

personally exercising all rights assured [place]





[xxxx] Incompetent to provide express and informed consent to voluntary 

admission, and this is incompetent to provide express and informed consent to 

treatment.  The person must be transferred to involuntary status and a petition 

for a guardian advocate filed with the Circuit Court.





[] Refusing to provide express and informed consent to voluntary admission but 

is competent to provide express and informed consent for treatment.  The person 

must be discharged or transferred to involuntary status.





Form shall be completed within 24 hours of a person's arrival at the receiving 

facility and filed in the clinical record of each person:


1. Admitted on a voluntary basis


2. Permitted to provide express and informed consent to his/her own treatment


3. Allowed to transfer from involuntary to voluntary status


4. Prior to permitting a person to consent to his or her own treatment after 

having been previously found incompetent to consent to treatment.





History of Present Illness


Capacity:  Lacks Capacity


Psych Chief Complaint:  Psychotic noncompliant medication night eating or 

drinking


HPI


Patient is 58-year-old white female who comes for under a [place] act by the 

[place] [employer] dated 5/24/18 at 1440 8 PM that document 

reviewed essentially stating diagnosed schizophrenic manic depressive according 

to his sister has not eaten or slept in a week attempted to speak with her she 

was making nonsensical statements in an unknown language is a history of mental 

illness and psych episodes officers were unable to obtain any information from 

the patient due to her mental state.  Patient seen screen in the ED urine 

toxicology positive for benzodiazepines and negative for alcohol.  At the 

present time patient sitting in a chair in her room counselor sonia present 

throughout session patient sitting in the chair in her room with a sheet 

covering her from her feet up to her neck.  She is babbling nonsensical phrases 

such as nanana  baba naba then she stated I will talk to you went back to her 

nonsensical syllables.  Further questioning elicited nothing more than those 

statements.  I then called patient's daughter Ms. Rachel Holloway in [phone].  She 

gave history of her mother suffering mental illness for many years with 

multiple psychiatric hospitalizations [place].  Mother has a history of chronic 

noncompliance medication denial of illness.  There is also been some misuse of 

pain medication.  There is also been a history of poor appetite with minimal 

oral intake both solids and fluids due to her paranoia and no delusions.  But 

is vague about any past history of physical or sexual abuse.  There is some 

vague history of misuse of pain medications.  Of interest the patient was 

incarcerated for a number of years.  Patient's  who is now incarcerated 

will be getting out of senior care around the end of the year.  Patient's 2 youngest 

children have joined the  within the past year also.  These episodes 

may have also been stressful for the patient.  Patient has been living with her 

daughter recently.  The daughter confirms O not complaints medication in the 

multiple psychiatric hospitalizations.  Daughter is willing to be healthcare 

surrogate/guardian advocate.  At the present time patient meets criteria for 

involuntary psychiatric hospitalization of the Camarena act L the first opinion 

request second opinion I feel she does not have capacity thus I will ask for 

health care surrogate and guardian advocate.  I have also done of the med 

reconciliation than the initial psychiatric template admission orders.  We will 

have a hospitalist consult will S we will have PT and OT consult will S.  We 

will start patient on Geodon 40 mg p.o. twice daily and if she refuses that 10 

mg Geodon IM in its place we will discontinue the Seroquel and the Paxil and 

the other psychotropics at this time





Review of Systems


ROS Limitations:  Clinical Condition, Altered Mental Status, Uncooperative





Past Psych History


Psychological trauma history


Difficult to ascertain due to patient's psychosis


Violence risk - others (6 mos)


Low


Violence risk - self (6 mos)


Low to moderate





Substance Abuse History


Drugs/Alcohol past 12 months


Vague history of opiate abuse





Past Family Social History


Coded Allergies:  


     Sulfa (Sulfonamide Antibiotics) (Unverified  Allergy, Severe, 3/11/18)


 NAUSEA/VOMITING/SOB


     haloperidol (Verified  Allergy, Severe, 5/24/18)


     iodine (Unverified  Allergy, Severe, Swelling, 3/11/18)


 SOB


     potassium iodide (Unverified  Allergy, Severe, Swelling, 3/11/18)


 SOB


     povidone-iodine (Unverified  Allergy, Severe, Swelling, 3/11/18)


 SOB


     sodium iodide (Unverified  Allergy, Severe, Swelling, 3/11/18)


 SOB


     sodium iodide (Unverified  Allergy, Severe, Swelling, 3/11/18)


 SOB


Reported Medications


Atenolol (Atenolol) 25 Mg Tab, 25 MG PO DAILY for Blood Pressure Management, #

30 TAB


   3/11/18


Alprazolam (Alprazolam) 1 Mg Tab, 1 MG PO TID Y for ANXIETY, TAB 0 Refills


   3/11/18


Carisoprodol (Carisoprodol) 250 Mg Tab, 350 MG PO BID Y for PAIN, TAB 0 Refills


   3/11/18


Oxycodone-Acetaminophen (Oxycodone-Acetaminophen)  mg Tab, 1 TAB PO Q6H Y 

for PAIN, #60 TAB 0 Refills


   3/11/18


Discontinued Reported Medications


Quetiapine (Seroquel) 25 Mg Tab, 25 MG PO HS, #30 TAB 0 Refills


   3/11/18


Paroxetine (Paxil) 10 Mg Tab, 20 MG PO DAILY, #30 TAB 0 Refills


   3/11/18


Methylphenidate IR (Ritalin IR) 10 Mg Tab, 10 MG PO BIDAC, #60 TAB 0 Refills


   3/11/18


Discontinued Scripts


Nitrofurantoin Monohydrate Macrocrystals (Macrobid) 100 Mg Cap, 100 MG PO BID 

for Infection for 7 Days, #14 CAP 0 Refills


   Prov:Rolly Simpson MD         3/11/18





Current Medications








 Medications


  (Trade)  Dose


 Ordered  Sig/Judit


 Route  Start Time


 Stop Time Status Last Admin


 


  (Tylenol)  650 mg  Q4H  PRN


 PO  5/24/18 19:00


     


 


 


  (Milk Of


 Magnesia Liq)  30 ml  DAILY  PRN


 PO  5/24/18 19:00


     


 


 


  (Mag-Al Plus


 Susp Liq)  30 ml  Q6H  PRN


 PO  5/24/18 19:00


     


 


 


  (Tenormin)  25 mg  DAILY


 PO  5/25/18 09:00


     


 


 


  (SEROquel)  25 mg  HS


 PO  5/24/18 21:00


    5/24/18 21:41


 


 


  (Paxil)  20 mg  DAILY


 PO  5/25/18 09:00


     


 


 


  (Ativan)  1 mg  Q6H  PRN


 PO  5/24/18 20:00


    5/24/18 21:41


 


 


  (Ativan Inj)  1 mg  Q6H  PRN


 IM  5/24/18 20:00


     


 


 


  (Ativan)  0.5 mg  Q12H  PRN


 PO  5/24/18 20:00


     


 


 


  (Ativan Inj)  0.5 mg  Q12H  PRN


 IM  5/24/18 20:00


     


 








Family Psych History


Vague history of brother with mental illness


Social History


Patient living with her adult daughter


Patient's Strengths (min. 2)


Patient support of family able access healthcare





Physical Exam


Patient medically cleared ED


Vital Signs





Vital Signs








  Date Time  Temp Pulse Resp B/P (MAP) Pulse Ox O2 Delivery O2 Flow Rate FiO2


 


5/24/18 20:45 97.9 131 18 117/81 (93) 98   


 


5/24/18 17:24      Room Air  








Lab Results











Test


  5/24/18


15:52 5/24/18


18:05


 


White Blood Count 7.5 TH/MM3  


 


Red Blood Count 4.59 MIL/MM3  


 


Hemoglobin 13.3 GM/DL  


 


Hematocrit 40.3 %  


 


Mean Corpuscular Volume 87.8 FL  


 


Mean Corpuscular Hemoglobin 29.0 PG  


 


Mean Corpuscular Hemoglobin


Concent 33.0 % 


  


 


 


Red Cell Distribution Width 14.6 %  


 


Platelet Count 280 TH/MM3  


 


Mean Platelet Volume 8.8 FL  


 


Neutrophils (%) (Auto) 68.0 %  


 


Lymphocytes (%) (Auto) 25.1 %  


 


Monocytes (%) (Auto) 6.3 %  


 


Eosinophils (%) (Auto) 0.1 %  


 


Basophils (%) (Auto) 0.5 %  


 


Neutrophils # (Auto) 5.1 TH/MM3  


 


Lymphocytes # (Auto) 1.9 TH/MM3  


 


Monocytes # (Auto) 0.5 TH/MM3  


 


Eosinophils # (Auto) 0.0 TH/MM3  


 


Basophils # (Auto) 0.0 TH/MM3  


 


CBC Comment DIFF FINAL  


 


Differential Comment   


 


Blood Urea Nitrogen 13 MG/DL  


 


Creatinine 1.12 MG/DL  


 


Random Glucose 119 MG/DL  


 


Total Protein 9.2 GM/DL  


 


Albumin 3.9 GM/DL  


 


Calcium Level 9.7 MG/DL  


 


Alkaline Phosphatase 89 U/L  


 


Aspartate Amino Transf


(AST/SGOT) 22 U/L 


  


 


 


Alanine Aminotransferase


(ALT/SGPT) 21 U/L 


  


 


 


Total Bilirubin 0.7 MG/DL  


 


Sodium Level 140 MEQ/L  


 


Potassium Level 3.9 MEQ/L  


 


Chloride Level 104 MEQ/L  


 


Carbon Dioxide Level 23.3 MEQ/L  


 


Anion Gap 13 MEQ/L  


 


Estimat Glomerular Filtration


Rate 50 ML/MIN 


  


 


 


Total Creatine Kinase 40 U/L  


 


Troponin I


  LESS THAN 0.02


NG/ML 


 


 


Free Thyroxine 0.87 NG/DL  


 


Free Triiodothyronine (T3)


pg/dL 2.67 PG/ML 


  


 


 


Thyroid Stimulating Hormone


3rd Gen 0.874 uIU/ML 


  


 


 


Ethyl Alcohol Level


  LESS THAN 3


MG/DL 


 


 


Urine Color  YELLOW 


 


Urine Turbidity  HAZY 


 


Urine pH  7.5 


 


Urine Specific Gravity  1.017 


 


Urine Protein  TRACE mg/dL 


 


Urine Glucose (UA)  NEG mg/dL 


 


Urine Ketones  10 mg/dL 


 


Urine Occult Blood  NEG 


 


Urine Nitrite  NEG 


 


Urine Bilirubin  NEG 


 


Urine Urobilinogen  2.0 MG/DL 


 


Urine Leukocyte Esterase  SMALL 


 


Urine RBC  1 /hpf 


 


Urine WBC  7 /hpf 


 


Urine Squamous Epithelial


Cells 


  2 /hpf 


 


 


Urine Transitional Epithelial


Cells 


  1 /hpf 


 


 


Urine Amorphous Sediment  OCC 


 


Urine Bacteria  RARE /hpf 


 


Urine Mucus  FEW /lpf 


 


Microscopic Urinalysis Comment


  


  CULT NOT


INDICATED


 


Urine Opiates Screen  NEG 


 


Urine Barbiturates Screen  NEG 


 


Urine Amphetamines Screen  NEG 


 


Urine Benzodiazepines Screen  POS 


 


Urine Cocaine Screen  NEG 


 


Urine Cannabinoids Screen  NEG 











Mental Status Examination


Appearance:  Disheveled


Consciousness:  Alert


Motor Activity:  Other (Patient sitting in chair unable to ascertain)


Speech:  Rapid, Incoherent


Language:  Perseveration


Fund of Knowledge:  Inadequate


Attention and Concentration:  Inadequate


Memory:  Impaired


Mood:  Irritable, Manic


Affect:  Other (Slight increased range and intensity)


Thought Process & Associations:  Other (Difficult to ascertain due to patient's 

perseveration)


Thought Content:  Bizarre thinking, Ideas of reference


Hallucination Type:  Other (Difficult to ascertain due to patient's psychosis)


Delusion Type:  Paranoid


Suicidal Ideation:  No (Difficult to ascertain due to patient's psychosis)


Suicidal Plan:  No (Difficult to ascertain due to patient's psychosis)


Suicidal Intention:  No (Difficult to ascertain due to patient's psychosis)


Homicidal Ideation:  No (If called to ascertain due to patient's psychosis)


Homicidal Plan:  No


Homicidal Intention:  No


Insight:  Poor


Judgment:  Poor





Assessment & Plan


Problem List:  


(1) Schizophrenia


ICD Codes:  F20.9 - Schizophrenia, unspecified


Status:  Acute


Assessment & Plan


Estimated LOS: 5-7 days patient quite psychotic at this time.  Patient does 

meet criteria for involuntary hospitalization of the Camarena act L the first 

opinion for second opinion, I prefer does not have capacity will ask for health 

care surrogate and guardian advocate.  We will have hospitalist consult will S 

will have PT OT consultation.  We did talk with patient's daughter will be 

healthcare surrogate/guardian advocate will start [facility] treatment


Discharge Planning


Possible return home with daughter


Request HC Surrog/Guard Advoc?:  Yes





Problem Qualifiers





(1) Schizophrenia:  


Qualified Codes:  F20.0 - Paranoid schizophrenia








Sean Aleman MD May 25, 2018 13:09

## 2018-05-25 NOTE — EKG
Date Performed: 2018       Time Performed: 16:08:33

 

PTAGE:      58 years

 

EKG:      SINUS TACHYCARDIA POSSIBLE RIGHT VENTRICULAR CONDUCTION DELAY ABNORMAL RHYTHM ECG Compared 
to 

 

 PREVIOUS TRACING            , the patient is now tachycardic. PREVIOUS TRACIN2018 15.21

 

DOCTOR:   Mary Santos  Interpretating Date/Time  2018 19:33:15

## 2018-05-25 NOTE — PD.CONS
HPI


Service


[facility]


Consult Requested By





Primary Care Physician


Unknown


Diagnoses:  


History of Present Illness


Mrs. Ratliff is a 58-year-old female.  She is admitted secondary to acute 

psychosis.  I introduced myself and initiated medical history and physical 

evaluation.  Patient declines to talk with me.  I was able to acquire whether 

or not she was in pain and she says she is not in pain and not interested in 

any pain medications.





Review of Systems


ROS Limitations:  Altered Mental Status, Uncooperative, Refused, Psychotic, 

Poor Historian





Past Family Social History


Allergies:  


Coded Allergies:  


     Sulfa (Sulfonamide Antibiotics) (Unverified  Allergy, Severe, 3/11/18)


 NAUSEA/VOMITING/SOB


     haloperidol (Verified  Allergy, Severe, 18)


     iodine (Unverified  Allergy, Severe, Swelling, 3/11/18)


 SOB


     potassium iodide (Unverified  Allergy, Severe, Swelling, 3/11/18)


 SOB


     povidone-iodine (Unverified  Allergy, Severe, Swelling, 3/11/18)


 SOB


     sodium iodide (Unverified  Allergy, Severe, Swelling, 3/11/18)


 SOB


     sodium iodide (Unverified  Allergy, Severe, Swelling, 3/11/18)


 SOB


Past Medical History


Unable to obtain directly secondary to psychosis





Rheumatoid arthritis


General anxiety disorder


Bipolar disorder


Schizophrenia


Reentrant tachycardia, Anibal-Parkinson-White syndrome


History of chest pain


Fatty liver disease


History of pyelonephritis


Multiple sclerosis





Past Surgical History


Unable to obtain directly secondary to psychosis





Appendectomy


History of 


Reported Medications





Reported Meds & Active Scripts


Active


Reported


Atenolol 25 Mg Tab 25 Mg PO DAILY


Seroquel (Quetiapine Fumarate) 25 Mg Tab 25 Mg PO HS


Paxil (Paroxetine HCl) 10 Mg Tab 20 Mg PO DAILY


Ritalin IR (Methylphenidate HCl) 10 Mg Tab 10 Mg PO BIDAC


Alprazolam 1 Mg Tab 1 Mg PO TID PRN


Carisoprodol 250 Mg Tab 350 Mg PO BID PRN


Oxycodone-Acetaminophen  mg Tab 1 Tab PO Q6H PRN


Active Ordered Medications





Administered Medications








 Medications


  (Trade)  Dose


 Ordered  Sig/Judit


 Route


 PRN Reason  Start Time


 Stop Time Status Last Admin


Dose Admin


 


 Quetiapine


 Fumarate


  (SEROquel)  25 mg  HS


 PO


   18 21:00


    18 21:41


 


 


 Lorazepam


  (Ativan)  1 mg  Q6H  PRN


 PO


 MODERATE TO SEVERE ANXIETY  18 20:00


    18 21:41


 








Family History


Unable to obtain secondary to psychosis


Social History


Unable to obtain directly secondary to psychosis





Based on previous history no past history of smoking, alcohol abuse, or illicit 

drug abuse





Physical Exam


Vital Signs





Vital Signs








  Date Time  Temp Pulse Resp B/P (MAP) Pulse Ox O2 Delivery O2 Flow Rate FiO2


 


18 20:45 97.9 131 18 117/81 (93) 98   


 


18 20:45 97.9 131 18 117/81 (93) 98   


 


18 17:24  110 16 129/64 (85) 99 Room Air  


 


18 15:55 98.9 129 16 126/67 (86) 100   


 


18 15:38 98.9 140 18 143/87 (105)    








Physical Exam


GENERAL: NAD, A&Ox3


HEAD: Normocephalic. 


NECK: Supple, trachea midline. No lymphadenopathy.


EYES: No scleral icterus. No injection or drainage. 


CARDIOVASCULAR: Regular rate and rhythm without murmurs, gallops, or rubs. 


RESPIRATORY: Breath sounds equal bilaterally. No accessory muscle use.


GASTROINTESTINAL: Abdomen soft, non-tender, nondistended. 


MUSCULOSKELETAL: No cyanosis, or edema. 


SKIN: Warm and dry.


NEURO:  No focal neurological deficitis.


Laboratory





Laboratory Tests








Test


  18


15:52 18


18:05


 


White Blood Count 7.5  


 


Red Blood Count 4.59  


 


Hemoglobin 13.3  


 


Hematocrit 40.3  


 


Mean Corpuscular Volume 87.8  


 


Mean Corpuscular Hemoglobin 29.0  


 


Mean Corpuscular Hemoglobin


Concent 33.0 


  


 


 


Red Cell Distribution Width 14.6  


 


Platelet Count 280  


 


Mean Platelet Volume 8.8  


 


Neutrophils (%) (Auto) 68.0  


 


Lymphocytes (%) (Auto) 25.1  


 


Monocytes (%) (Auto) 6.3  


 


Eosinophils (%) (Auto) 0.1  


 


Basophils (%) (Auto) 0.5  


 


Neutrophils # (Auto) 5.1  


 


Lymphocytes # (Auto) 1.9  


 


Monocytes # (Auto) 0.5  


 


Eosinophils # (Auto) 0.0  


 


Basophils # (Auto) 0.0  


 


CBC Comment DIFF FINAL  


 


Differential Comment   


 


Blood Urea Nitrogen 13  


 


Creatinine 1.12  


 


Random Glucose 119  


 


Total Protein 9.2  


 


Albumin 3.9  


 


Calcium Level 9.7  


 


Alkaline Phosphatase 89  


 


Aspartate Amino Transf


(AST/SGOT) 22 


  


 


 


Alanine Aminotransferase


(ALT/SGPT) 21 


  


 


 


Total Bilirubin 0.7  


 


Sodium Level 140  


 


Potassium Level 3.9  


 


Chloride Level 104  


 


Carbon Dioxide Level 23.3  


 


Anion Gap 13  


 


Estimat Glomerular Filtration


Rate 50 


  


 


 


Total Creatine Kinase 40  


 


Troponin I LESS THAN 0.02  


 


Free Thyroxine 0.87  


 


Free Triiodothyronine (T3)


pg/dL 2.67 


  


 


 


Thyroid Stimulating Hormone


3rd Gen 0.874 


  


 


 


Ethyl Alcohol Level LESS THAN 3  


 


Urine Color  YELLOW 


 


Urine Turbidity  HAZY 


 


Urine pH  7.5 


 


Urine Specific Gravity  1.017 


 


Urine Protein  TRACE 


 


Urine Glucose (UA)  NEG 


 


Urine Ketones  10 


 


Urine Occult Blood  NEG 


 


Urine Nitrite  NEG 


 


Urine Bilirubin  NEG 


 


Urine Urobilinogen  2.0 


 


Urine Leukocyte Esterase  SMALL 


 


Urine RBC  1 


 


Urine WBC  7 


 


Urine Squamous Epithelial


Cells 


  2 


 


 


Urine Transitional Epithelial


Cells 


  1 


 


 


Urine Amorphous Sediment  OCC 


 


Urine Bacteria  RARE 


 


Urine Mucus  FEW 


 


Microscopic Urinalysis Comment


  


  CULT NOT


INDICATED


 


Urine Opiates Screen  NEG 


 


Urine Barbiturates Screen  NEG 


 


Urine Amphetamines Screen  NEG 


 


Urine Benzodiazepines Screen  POS 


 


Urine Cocaine Screen  NEG 


 


Urine Cannabinoids Screen  NEG 








Result Diagram:  


18 1552                                                                   

             18 1552








Assessment and Plan


Problem List:  


(1) Bipolar disorder with severe leonard


ICD Code:  F31.13 - Bipolar disorder, current episode manic without psychotic 

features, severe


Status:  Acute


(2) Schizophrenia


ICD Code:  F20.9 - Schizophrenia, unspecified


Status:  Acute


(3) Unspecified psychosis


ICD Code:  F29 - Unspecified psychosis not due to a substance or known 

physiological condition


Status:  Acute


Assessment and Plan


58-year-old female admitted secondary to unspecified psychosis





Patient is not cooperative during history and physical.  She is refusing pain 

treatments.





Acute psychosis


General anxiety disorder


Bipolar disorder


Schizophrenia


Management per psychiatry





Reentrant tachycardia, Anibal-Parkinson-White syndrome


History of chest pain


Fatty liver disease


History of pyelonephritis


Multiple sclerosis


No exacerbations of these conditions are seen thus far


Follow clinically


Follow for any evidence narcotic withdraw, given patient is not taking her 

baseline treatment presently.





DVT prophylaxis


Patient is ambulatory














Enrike Fournier MD May 25, 2018 10:48

## 2018-05-26 RX ADMIN — ZIPRASIDONE HYDROCHLORIDE SCH MG: 40 CAPSULE ORAL at 09:00

## 2018-05-26 RX ADMIN — ZIPRASIDONE HYDROCHLORIDE SCH MG: 40 CAPSULE ORAL at 09:41

## 2018-05-26 RX ADMIN — ATENOLOL SCH MG: 25 TABLET ORAL at 09:41

## 2018-05-26 RX ADMIN — ZIPRASIDONE MESYLATE PRN MG: 20 INJECTION, POWDER, LYOPHILIZED, FOR SOLUTION INTRAMUSCULAR at 11:04

## 2018-05-26 RX ADMIN — ATENOLOL SCH MG: 25 TABLET ORAL at 09:00

## 2018-05-26 RX ADMIN — ZIPRASIDONE HYDROCHLORIDE SCH MG: 40 CAPSULE ORAL at 18:00

## 2018-05-26 RX ADMIN — ZIPRASIDONE MESYLATE PRN MG: 20 INJECTION, POWDER, LYOPHILIZED, FOR SOLUTION INTRAMUSCULAR at 18:38

## 2018-05-27 VITALS — OXYGEN SATURATION: 98 % | DIASTOLIC BLOOD PRESSURE: 56 MMHG | SYSTOLIC BLOOD PRESSURE: 94 MMHG | HEART RATE: 81 BPM

## 2018-05-27 VITALS
OXYGEN SATURATION: 99 % | DIASTOLIC BLOOD PRESSURE: 51 MMHG | SYSTOLIC BLOOD PRESSURE: 72 MMHG | RESPIRATION RATE: 17 BRPM | HEART RATE: 90 BPM

## 2018-05-27 VITALS — SYSTOLIC BLOOD PRESSURE: 92 MMHG | HEART RATE: 84 BPM | DIASTOLIC BLOOD PRESSURE: 54 MMHG | OXYGEN SATURATION: 98 %

## 2018-05-27 VITALS
OXYGEN SATURATION: 98 % | RESPIRATION RATE: 17 BRPM | HEART RATE: 90 BPM | SYSTOLIC BLOOD PRESSURE: 72 MMHG | DIASTOLIC BLOOD PRESSURE: 51 MMHG

## 2018-05-27 VITALS — OXYGEN SATURATION: 97 %

## 2018-05-27 RX ADMIN — ATENOLOL SCH MG: 25 TABLET ORAL at 09:00

## 2018-05-27 RX ADMIN — ATENOLOL SCH MG: 25 TABLET ORAL at 18:06

## 2018-05-27 RX ADMIN — ZIPRASIDONE HYDROCHLORIDE SCH MG: 60 CAPSULE ORAL at 18:07

## 2018-05-27 NOTE — RADRPT
EXAM DATE:  2018 10:46 PM EDT

AGE/SEX:        58 years / Female



INDICATIONS:  Syncopal episode.



CLINICAL DATA:  This is the patient's initial encounter. Patient reports that signs and symptoms have
 been present for 1 day and indicates a pain score of 0/10. 

                                                                          

MEDICAL/SURGICAL HISTORY:   Cardiovascular disease. Parkinson's disease  Appendectomy.   sect
ion.



RADIATION DOSE:  56.35 CTDI (mGy)







COMPARISON:      [facility], CT BRAIN W/O CONTRAST, 3/11/2018.  .





TECHNIQUE:  CT of the head without contrast.  Using automated exposure control and adjustment of the 
mA and/or kV according to patient size, radiation dose was kept as low as reasonably achievable to ob
tain optimal diagnostic quality images.



FINDINGS: 

Cerebrum:  The ventricles are normal for age.  No evidence of midline shift, mass lesion, hemorrhage 
or acute infarction.  No extraaxial fluid collections are seen.

Posterior Fossa:  The cerebellum and brainstem are intact.  The 4th ventricle is midline.  The cerebe
llopontine angle is unremarkable.

Extracranial:  The visualized portion of the orbits is intact.

Skull:  The calvaria is intact.  No evidence of skull fracture.







CONCLUSION:

1.  Negative CT Head non contrast.

2.  Stable exam without evidence of acute infarct, hemorrhage, mass or edema.



Electronically signed by: Ever Navarro MD  2018 10:49 PM EDT

## 2018-05-27 NOTE — HHI.PR
Subjective


Remarks


Pt seen w RN


She tells me that she is not hungry and doesn't want any food. She denies any 

pain, nausea or vomiting. 


I did tell her that when she is ready to eat she can request food or something 

to drink and she acknowledges it. 


When asked why she doesn't take her medication, she states "I don't want it"





Objective


Vitals











I/O      


 


 5/26/18 5/26/18 5/26/18 5/27/18 5/27/18 5/27/18





 07:00 15:00 23:00 07:00 15:00 23:00


 


Intake Total   0 ml   


 


Balance   0 ml   


 


      


 


Intake Oral   0 ml   








Result Diagram:  


5/24/18 1552                                                                   

             5/24/18 1552





Objective Remarks


GENERAL: alert, laying in bed


CARDIOVASCULAR: mildly tachycardic but Regular  without murmurs 


RESPIRATORY: Breath sounds equal bilaterally.  


ABD: no guarding





A/P


Problem List:  


(1) Bipolar disorder with severe leonard


ICD Code:  F31.13 - Bipolar disorder, current episode manic without psychotic 

features, severe


Status:  Acute


(2) Schizophrenia


ICD Code:  F20.9 - Schizophrenia, unspecified


Status:  Acute


(3) Unspecified psychosis


ICD Code:  F29 - Unspecified psychosis not due to a substance or known 

physiological condition


Status:  Acute


Assessment and Plan


58-year-old female admitted secondary to unspecified psychosis





She is refusing to eat or take her medication. I have explained the importance 

of taking her meds and she tells me "I don't want them". I did speak w 

psychiatry and at this time, they cannot force her to take them. From a medical 

standpoint, clinically she is stable and asymptomatic. Vitals reviewed and 

other than some tachycardia, they are stable. at this time, the medical team 

will sign off. Please reconsult as needed. 





Acute psychosis/General anxiety disorder/Bipolar disorder/Schizophrenia


Management per psychiatry





Reentrant tachycardia, Anibal-Parkinson-White syndrome/History of chest pain/

Fatty liver disease/History of pyelonephritis/Multiple sclerosis


No exacerbations of these conditions are seen thus far


Follow clinically


Follow for any evidence narcotic withdraw, given patient is not taking her 

baseline treatment presently.





DVT prophylaxis


Patient is ambulatory


Discharge Planning


per primary team





Problem Qualifiers





(1) Schizophrenia:  


Qualified Codes:  F20.0 - Paranoid schizophrenia








Elizabeth Antonio MD May 27, 2018 09:04

## 2018-05-27 NOTE — HHI.PYPN
Subjective


Chief Complaint:  Psychotic noncompliant medication night eating or drinking


Remarks


Patient seen and examined with nurse in weekend coverage.  Chart reviewed.  

Case discussed with nursing staff.  Patient continues to refuse all food and 

fluids.  On my examination today, the patient tells me that she will continue 

to refuse all food and fluids and that "nothing in the world" will change this 

plan.  She tells me that she is on some sort of Mu-ism fast and that she is 

"just waiting" for word from God.  She admits that she had a similar episode 9 

years ago when she was in [facility] and that this fast was broken by 

"psycho drugs."  She denies any genuine urge to self injure.  Her insight into 

the dangerousness of her ongoing fast seems quite poor.  No side effects from 

medications.  Patient continues to plan to refuse oral Geodon.  No physical 

complaints.





Review of Systems


ROS Limitations:  Psychotic, Poor Historian


Except as stated in HPI:  all other systems reviewed are Neg





Mental Status Examination


Appearance:  Disheveled


Consciousness:  Alert


Orientation:  Person, Place (At least)


Motor Activity:  Other (No abnormal motor movements noted)


Speech:  Unremarkable


Language:  Adequate


Fund of Knowledge:  Adequate


Attention and Concentration:  Adequate


Memory:  Unremarkable (Grossly intact on clinical exam)


Mood:  Other (Calm)


Affect:  Blunt


Thought Process & Associations:  Linear (Within delusional system)


Thought Content:  Delusional


Hallucination Type:  None


Delusion Type:  Other (Sikhism)


Suicidal Ideation:  No (Denies SI but is injuring self by neglect)


Homicidal Ideation:  No (No HI voiced)


Insight:  Poor


Judgment:  Poor





Results


Labs


Labs reviewed.


Vitals/IOs





Vital Signs








  Date Time  Temp Pulse Resp B/P (MAP) Pulse Ox O2 Delivery O2 Flow Rate FiO2


 


5/24/18 20:45 97.9 131 18 117/81 (93) 98   


 


5/24/18 17:24      Room Air  











Assessment & Plan


Problem List:  


(1) Schizophrenia


ICD Codes:  F20.9 - Schizophrenia, unspecified


Status:  Acute


Assessment & Plan


Titrate Geodon to 60 mg twice a day with meals by mouth and 15 mg twice a day 

by meals IM if patient should refuse p.o.. Dietitian consult given poor oral 

intake.  Daily weights.  I&Os.  Check BMP, Mg, PO4 in light of poor oral 

intake.  To consider transfer to medical psychiatric unit for IV hydration if 

renal function has worsened.  Hospitalist input noted and appreciated.  

Continue other medications and care as ordered.


Justification for Cont. Inpt.


Impairment in self-care.  Impairment in safety.  Medication changes.  

Impairment in reality construction.  High risk for decompensation in less 

restrictive environment.


Discharge Planning


Per primary psychiatrist.


Request HC Surrog/Guard Advoc?:  Yes





Problem Qualifiers





(1) Schizophrenia:  


Qualified Codes:  F20.0 - Paranoid schizophrenia








Raul Booker MD May 27, 2018 11:53

## 2018-05-27 NOTE — HHI.FPPN
Addendum to progress note


ADDENDUM


Reason for addendum:  Additonal documentation


Additional information


HALICAT NOTE:


S: Residents responded to a HALICAT call for a 58-year-old female patient with 

a history of schizophrenia and bipolar disorder with severe leonard presenting 

with syncope and hypotension.





Episode began when patient was being weighed by nurse.  Nurse saw that patient'

s eyes rolled up and closed, and patient suddenly fell backwards.  No tremors 

or shaking seen.   Patient recovered quickly and was able to be transferred to 

a chair where blood pressure was taken.  Blood pressure result in 1 arm was 72/

51.  The blood pressure result in the other arm was a SBP of around 50.  Nurse 

thought this was likely an error.  Once patient seemed better, patient then 

resumed walking.  A little bit later, she was found collapsed in the hallway.  

Nurse said that she heard a sound like something hit the floor and assumed that 

patient had hit her head.  Patient states that she does not remember what 

happened, thinks that she blacked out.  Patient denies headache, visual changes

, prodrome symptoms, palpitations, chest pain, shortness of breath, or 

dizziness.  Had just started taking 1 of her home medications of atenolol 25 mg 

today - she took this 3 hours prior to incident with dinner at around 5 PM.  

Before this, patient had not been eating in the past several days due to a 

"Islam fast that she is on."  Blood pressure was taken 10 minutes after the 

syncopal episode (when we arrived) and was found to be 94/56. Bedside glucose 

was 129.  Patient refuses to drink any fluids or eat anything at this time.  

States that her legs feel weak. Per general medicine note from today, patient 

has a history of reentrant tachycardia and Anibal-Parkinson-White syndrome.  

Patient has been tachycardic for the first couple days of admission, with heart 

rate above 100.  Last recorded heart rate was 131 on 5/24. 


MEDS: ziprasidone 60 mg BID, atenolol 25 mg daily (new)





O: Blood pressure 94/56, heart rate 86, PO2 97%


General: 58-year-old woman who appears older than her age looking alert and in 

no acute distress


Neuro: PERRLA. Cranial nerves II to XII intact, no focal deficits.  Sensation 

intact bilaterally.  5/5 motor strength in the upper and lower extremities.  

Normal finger to nose testing


Cardio: Regular rate and rhythm


Respiratory: Clear to auscultation bilaterally


MSK: no edema or bruising. No lesions or bleeding noted. No swelling of scalp 

or injury. Normal ROM of extremities





A/P: 58-year-old female presenting with syncope.


Differential: Hypotensive versus vasovagal versus cardiogenic syncope v seizure


Will need to transfer to an area where the following can be performed:


- Plan to rule out ACS with cardiac enzyme and troponin, EKG


- Vitals every 4H


- Cardiac telemetry monitoring


- Out of bed with assistance


- 1 L bolus normal saline


- CT head to evaluate for head injury from fall


- CBC for anemia or infection


- CMP for electrolyte disturbance


- HOLD atenolol


Low concern for seizure at this time. May warrant further eval based on 

clinical course. 





Seen w/Leslie Tuttle MD R1 May 27, 2018 22:18

## 2018-05-28 VITALS
DIASTOLIC BLOOD PRESSURE: 54 MMHG | TEMPERATURE: 98.2 F | HEART RATE: 89 BPM | OXYGEN SATURATION: 96 % | RESPIRATION RATE: 16 BRPM | SYSTOLIC BLOOD PRESSURE: 125 MMHG

## 2018-05-28 VITALS
DIASTOLIC BLOOD PRESSURE: 57 MMHG | HEART RATE: 82 BPM | OXYGEN SATURATION: 97 % | TEMPERATURE: 97.5 F | SYSTOLIC BLOOD PRESSURE: 94 MMHG | RESPIRATION RATE: 16 BRPM

## 2018-05-28 VITALS
TEMPERATURE: 97 F | HEART RATE: 89 BPM | SYSTOLIC BLOOD PRESSURE: 104 MMHG | OXYGEN SATURATION: 94 % | RESPIRATION RATE: 16 BRPM | DIASTOLIC BLOOD PRESSURE: 56 MMHG

## 2018-05-28 VITALS
DIASTOLIC BLOOD PRESSURE: 59 MMHG | HEART RATE: 85 BPM | OXYGEN SATURATION: 96 % | RESPIRATION RATE: 16 BRPM | SYSTOLIC BLOOD PRESSURE: 110 MMHG | TEMPERATURE: 98.7 F

## 2018-05-28 VITALS
SYSTOLIC BLOOD PRESSURE: 122 MMHG | RESPIRATION RATE: 16 BRPM | DIASTOLIC BLOOD PRESSURE: 56 MMHG | OXYGEN SATURATION: 96 % | TEMPERATURE: 98.6 F | HEART RATE: 83 BPM

## 2018-05-28 VITALS
DIASTOLIC BLOOD PRESSURE: 54 MMHG | HEART RATE: 89 BPM | SYSTOLIC BLOOD PRESSURE: 125 MMHG | RESPIRATION RATE: 16 BRPM | TEMPERATURE: 98.2 F | OXYGEN SATURATION: 96 %

## 2018-05-28 LAB
ALBUMIN SERPL-MCNC: 4.1 GM/DL (ref 3.4–5)
ALP SERPL-CCNC: 82 U/L (ref 45–117)
ALT SERPL-CCNC: 31 U/L (ref 10–53)
AST SERPL-CCNC: 27 U/L (ref 15–37)
BASOPHILS # BLD AUTO: 0 TH/MM3 (ref 0–0.2)
BASOPHILS NFR BLD: 0.3 % (ref 0–2)
BILIRUB SERPL-MCNC: 0.8 MG/DL (ref 0.2–1)
BUN SERPL-MCNC: 36 MG/DL (ref 7–18)
CALCIUM SERPL-MCNC: 9.4 MG/DL (ref 8.5–10.1)
CHLORIDE SERPL-SCNC: 105 MEQ/L (ref 98–107)
CHOLEST SERPL-MCNC: 276 MG/DL (ref 120–200)
CHOLESTEROL/ HDL RATIO: 4.87 RATIO
CREAT SERPL-MCNC: 1.05 MG/DL (ref 0.5–1)
EOSINOPHIL # BLD: 0 TH/MM3 (ref 0–0.4)
EOSINOPHIL NFR BLD: 0.3 % (ref 0–4)
ERYTHROCYTE [DISTWIDTH] IN BLOOD BY AUTOMATED COUNT: 14.5 % (ref 11.6–17.2)
GFR SERPLBLD BASED ON 1.73 SQ M-ARVRAT: 54 ML/MIN (ref 89–?)
GLUCOSE SERPL-MCNC: 117 MG/DL (ref 74–106)
HBA1C MFR BLD: 5.3 % (ref 4.3–6)
HCO3 BLD-SCNC: 26.2 MEQ/L (ref 21–32)
HCT VFR BLD CALC: 38.3 % (ref 35–46)
HDLC SERPL-MCNC: 56.6 MG/DL (ref 40–60)
HGB BLD-MCNC: 12.8 GM/DL (ref 11.6–15.3)
LDLC SERPL-MCNC: 203 MG/DL (ref 0–99)
LYMPHOCYTES # BLD AUTO: 2 TH/MM3 (ref 1–4.8)
LYMPHOCYTES NFR BLD AUTO: 23.6 % (ref 9–44)
MAGNESIUM SERPL-MCNC: 2.5 MG/DL (ref 1.5–2.5)
MCH RBC QN AUTO: 29.6 PG (ref 27–34)
MCHC RBC AUTO-ENTMCNC: 33.5 % (ref 32–36)
MCV RBC AUTO: 88.2 FL (ref 80–100)
MONOCYTE #: 0.6 TH/MM3 (ref 0–0.9)
MONOCYTES NFR BLD: 7.5 % (ref 0–8)
NEUTROPHILS # BLD AUTO: 5.7 TH/MM3 (ref 1.8–7.7)
NEUTROPHILS NFR BLD AUTO: 68.3 % (ref 16–70)
PHOSPHATE SERPL-MCNC: 3.4 MG/DL (ref 2.5–4.9)
PLATELET # BLD: 228 TH/MM3 (ref 150–450)
PMV BLD AUTO: 8.8 FL (ref 7–11)
PROT SERPL-MCNC: 8.8 GM/DL (ref 6.4–8.2)
RBC # BLD AUTO: 4.34 MIL/MM3 (ref 4–5.3)
SODIUM SERPL-SCNC: 140 MEQ/L (ref 136–145)
TRIGL SERPL-MCNC: 82 MG/DL (ref 42–150)
TROPONIN I SERPL-MCNC: (no result) NG/ML (ref 0.02–0.05)
WBC # BLD AUTO: 8.3 TH/MM3 (ref 4–11)

## 2018-05-28 RX ADMIN — ZIPRASIDONE HYDROCHLORIDE SCH MG: 60 CAPSULE ORAL at 08:59

## 2018-05-28 RX ADMIN — ZIPRASIDONE HYDROCHLORIDE SCH MG: 60 CAPSULE ORAL at 17:42

## 2018-05-28 RX ADMIN — ZIPRASIDONE MESYLATE PRN MG: 20 INJECTION, POWDER, LYOPHILIZED, FOR SOLUTION INTRAMUSCULAR at 17:41

## 2018-05-28 RX ADMIN — ZIPRASIDONE MESYLATE PRN MG: 20 INJECTION, POWDER, LYOPHILIZED, FOR SOLUTION INTRAMUSCULAR at 08:59

## 2018-05-28 NOTE — EKG
Date Performed: 05/27/2018       Time Performed: 22:34:36

 

PTAGE:      58 years

 

EKG:      Sinus rhythm 

 

 POSSIBLE RIGHT VENTRICULAR CONDUCTION DELAY BORDERLINE ECG

 

PREVIOUS TRACING       : 05/24/2018 16.08 No significant change from previous tracing noted.

 

DOCTOR:   Ming Amezquita  Interpretating Date/Time  05/28/2018 08:04:48

## 2018-05-28 NOTE — HHI.PR
Subjective


Remarks


Patient had HALICAT Last night was transferred to inpatient [facility] psychiatry from 

the regular psychiatric unit


Patient is already pulled out her IV


Nurses asking for it to remain out


Patient currently complains of no acute issues looks comfortable





Objective


Vitals





Vital Signs








  Date Time  Temp Pulse Resp B/P (MAP) Pulse Ox O2 Delivery O2 Flow Rate FiO2


 


5/28/18 06:46 98.2 89 16 125/54 (77) 96   


 


5/28/18 06:28 98.2 89 16 125/54 (77) 96   


 


5/28/18 02:00 97.5 82 16 94/57 (69) 97   


 


5/27/18 21:55  84  92/54 (67) 98   


 


5/27/18 21:45  81  94/56 (69) 98   


 


5/27/18 21:40     97   21


 


5/27/18 21:30  90 17 72/51 (58) 98   














I/O      


 


 5/27/18 5/27/18 5/27/18 5/28/18 5/28/18 5/28/18





 07:00 15:00 23:00 07:00 15:00 23:00


 


      


 


# Voids    2  








Result Diagram:  


5/28/18 0115                                                                   

             5/28/18 0115





Other Results





 Laboratory Tests








Test


  5/28/18


01:15


 


White Blood Count 8.3 TH/MM3 


 


Red Blood Count 4.34 MIL/MM3 


 


Hemoglobin 12.8 GM/DL 


 


Hematocrit 38.3 % 


 


Mean Corpuscular Volume 88.2 FL 


 


Mean Corpuscular Hemoglobin 29.6 PG 


 


Mean Corpuscular Hemoglobin


Concent 33.5 % 


 


 


Red Cell Distribution Width 14.5 % 


 


Platelet Count 228 TH/MM3 


 


Mean Platelet Volume 8.8 FL 


 


Neutrophils (%) (Auto) 68.3 % 


 


Lymphocytes (%) (Auto) 23.6 % 


 


Monocytes (%) (Auto) 7.5 % 


 


Eosinophils (%) (Auto) 0.3 % 


 


Basophils (%) (Auto) 0.3 % 


 


Neutrophils # (Auto) 5.7 TH/MM3 


 


Lymphocytes # (Auto) 2.0 TH/MM3 


 


Monocytes # (Auto) 0.6 TH/MM3 


 


Eosinophils # (Auto) 0.0 TH/MM3 


 


Basophils # (Auto) 0.0 TH/MM3 


 


CBC Comment DIFF FINAL 


 


Differential Comment  


 


Blood Urea Nitrogen 36 MG/DL 


 


Creatinine 1.05 MG/DL 


 


Random Glucose 117 MG/DL 


 


Total Protein 8.8 GM/DL 


 


Albumin 4.1 GM/DL 


 


Calcium Level 9.4 MG/DL 


 


Phosphorus Level 3.4 MG/DL 


 


Magnesium Level 2.5 MG/DL 


 


Alkaline Phosphatase 82 U/L 


 


Aspartate Amino Transf


(AST/SGOT) 27 U/L 


 


 


Alanine Aminotransferase


(ALT/SGPT) 31 U/L 


 


 


Total Bilirubin 0.8 MG/DL 


 


Sodium Level 140 MEQ/L 


 


Potassium Level 4.0 MEQ/L 


 


Chloride Level 105 MEQ/L 


 


Carbon Dioxide Level 26.2 MEQ/L 


 


Anion Gap 9 MEQ/L 


 


Estimat Glomerular Filtration


Rate 54 ML/MIN 


 


 


Total Creatine Kinase 78 U/L 


 


Troponin I


  LESS THAN 0.02


NG/ML


 


Triglycerides Level 82 MG/DL 


 


Cholesterol Level 276 MG/DL 


 


LDL Cholesterol 203 MG/DL 


 


HDL Cholesterol 56.6 MG/DL 


 


Cholesterol/HDL Ratio 4.87 RATIO 








Imaging





Last Impressions








Head CT 5/27/18 0000 Signed





Impressions: 





 CONCLUSION:





 1.  Negative CT Head non contrast.





 2.  Stable exam without evidence of acute infarct, hemorrhage, mass or edema.





  





 








Objective Remarks


GENERAL: Awake alert talkative and cooperative-follows all my commands


SKIN: Warm and dry.


HEAD: Atraumatic. Normocephalic. 


EYES: Pupils equal and round. No scleral icterus. No injection or drainage.  

Extraocular muscles intact


ENT: No nasal bleeding or discharge.  Mucous membranes pink and moist.  Tongue 

is midline


NECK: Trachea midline. No JVD.  Supple


CARDIOVASCULAR: Regular rate and rhythm S1-S2 no S3 or S4


RESPIRATORY: No accessory muscle use. Clear to auscultation. Breath sounds 

equal bilaterally. 


GASTROINTESTINAL: Abdomen soft, non-tender, nondistended. Hepatic and splenic 

margins not palpable. 


MUSCULOSKELETAL: Extremities without clubbing, cyanosis, or edema. No obvious 

deformities. 


NEUROLOGICAL: Awake and alert. No obvious cranial nerve deficits.  Motor 

grossly within normal limits. Five out of 5 muscle strength in the arms and 

legs.  Normal speech.


PSYCHIATRIC: INAppropriate mood and affect; insight and judgment ABnormal.


Procedures


NONE


Medications and IVs





Current Medications


Multivitamins (Theragran) 1 tab ONCE  ONCE PO ;  Start 5/24/18 at 16:45;  Stop 5 /24/18 at 16:46;  Status DC


Sodium Chloride 1,000 ml @  999 mls/hr BOLUS  ONCE IV  Last administered on 5/24 /18at 17:23;  Start 5/24/18 at 16:45;  Stop 5/24/18 at 17:45;  Status DC


Acetaminophen (Tylenol) 650 mg Q4H  PRN PO Pain 1-5 or Temp >101F;  Start 5/24/ 18 at 19:00;  Stop 5/25/18 at 12:36;  Status DC


Magnesium Hydroxide (Milk Of Magnesia Liq) 30 ml DAILY  PRN PO CONSTIPATION;  

Start 5/24/18 at 19:00


Al Hydrox/Mg Hydrox/Simethicone (Mag-Al Plus Susp Liq) 30 ml Q6H  PRN PO 

DYSPEPSIA;  Start 5/24/18 at 19:00


Atenolol (Tenormin) 25 mg DAILY PO  Last administered on 5/27/18at 18:06;  

Start 5/25/18 at 09:00;  Status Future Hold


Quetiapine Fumarate (SEROquel) 25 mg HS PO  Last administered on 5/24/18at 21:41

;  Start 5/24/18 at 21:00;  Stop 5/25/18 at 12:25;  Status DC


Paroxetine HCl (Paxil) 20 mg DAILY PO ;  Start 5/25/18 at 09:00;  Stop 5/25/18 

at 12:25;  Status DC


Lorazepam (Ativan) 1 mg Q6H  PRN PO MODERATE TO SEVERE ANXIETY Last 

administered on 5/24/18at 21:41;  Start 5/24/18 at 20:00;  Stop 5/25/18 at 12:25

;  Status DC


Lorazepam (Ativan Inj) 1 mg Q6H  PRN IM MODERATE TO SEVERE ANXIETY;  Start 5/24/ 18 at 20:00;  Stop 5/25/18 at 12:25;  Status DC


Lorazepam (Ativan) 0.5 mg Q12H  PRN PO MODERATE TO SEVERE ANXIETY;  Start 5/24/ 18 at 20:00;  Stop 5/25/18 at 12:25;  Status DC


Lorazepam (Ativan Inj) 0.5 mg Q12H  PRN IM MODERATE TO SEVERE ANXIETY;  Start 5/ 24/18 at 20:00;  Stop 5/25/18 at 12:25;  Status DC


Morphine Sulfate (Oramorph Sr) 15 mg Q12HR PO ;  Start 5/25/18 at 09:00;  Stop 5 /25/18 at 10:41;  Status DC


Ziprasidone (Geodon) 40 mg BIDPC PO ;  Start 5/25/18 at 12:30;  Stop 5/27/18 at 

11:49;  Status DC


Ziprasidone (Geodon Inj) 10 mg BIDPC  PRN IM If patient uses oral dose Last 

administered on 5/26/18at 18:38;  Start 5/25/18 at 12:30;  Stop 5/27/18 at 11:49

;  Status DC


Diphenhydramine HCl (Benadryl) 50 mg HS  PRN PO INSOMNIA;  Start 5/25/18 at 12:

30


Acetaminophen (Tylenol) 650 mg Q4H  PRN PO Pain 1-5 or Temp >101F;  Start 5/25/ 18 at 12:30


Hydroxyzine HCl (Atarax) 50 mg Q6H  PRN PO ANXIETY;  Start 5/25/18 at 12:30


Oxycodone/ Acetaminophen (Percocet  Mg) 1 tab Q6H  PRN PO PAIN SCALE 6-10

;  Start 5/25/18 at 12:30


Ziprasidone (Geodon) 60 mg BIDPC PO  Last administered on 5/27/18at 18:07;  

Start 5/27/18 at 18:00


Ziprasidone (Geodon Inj) 15 mg BIDPC  PRN IM If patient refuses oral dose Last 

administered on 5/28/18at 08:59;  Start 5/27/18 at 12:00


Sodium Chloride 1,000 ml @  999 mls/hr BOLUS  ONCE IV  Last administered on 5/27 /18at 22:15;  Start 5/27/18 at 22:15;  Stop 5/27/18 at 23:15;  Status DC





A/P


Problem List:  


(1) Bipolar disorder with severe leonard


ICD Code:  F31.13 - Bipolar disorder, current episode manic without psychotic 

features, severe


Status:  Acute


(2) Schizophrenia


ICD Code:  F20.9 - Schizophrenia, unspecified


Status:  Acute


(3) Unspecified psychosis


ICD Code:  F29 - Unspecified psychosis not due to a substance or known 

physiological condition


Status:  Acute


Assessment and Plan


58-year-old female admitted secondary to unspecified psychosis





She is refusing to eat or take her medication. I have explained the importance 

of taking her meds and she tells me "I don't want them". I did speak w 

psychiatry and at this time, they cannot force her to take them. From a medical 

standpoint, clinically she is stable and asymptomatic. Vitals reviewed and 

other than some tachycardia, they are stable. at this time, the medical team 

will sign off. Please reconsult as needed. 





Acute psychosis/General anxiety disorder/Bipolar disorder/Schizophrenia


Management per psychiatry





Reentrant tachycardia, Anibal-Parkinson-White syndrome/History of chest pain/

Fatty liver disease/History of pyelonephritis/Multiple sclerosis


No exacerbations of these conditions are seen thus far


Follow clinically


Follow for any evidence narcotic withdraw, given patient is not taking her 

baseline treatment presently.





SP HALICAT


NOW OFF IV FLUIDS AND CURRENTLY STABLE





AM LABS





OK TO KEEP IV OUT AT THIS MOMENT





DVT prophylaxis


Patient is ambulatory


Discharge Planning


AM LABS





Problem Qualifiers





(1) Schizophrenia:  


Qualified Codes:  F20.0 - Paranoid schizophrenia








Nawaf Pike DO May 28, 2018 09:14

## 2018-05-28 NOTE — HHI.PYPN
Subjective


Chief Complaint:  Psychotic noncompliant medication night eating or drinking


Remarks


Patient seen in her room on [address] with RN, chart reviewed, patient trying mixed 

compliance with medication, patient did pull out her IV this morning.  Did 

refuse her morning meds.  She says she does not need them right now."  She does 

deny voices today.  Denies suicidality.  For now continue medications no change





Review of Systems


Except as stated in HPI:  all other systems reviewed are Neg





Mental Status Examination


Appearance:  Disheveled


Consciousness:  Alert


Orientation:  Person, Place (At least)


Motor Activity:  Other (No abnormal motor movements noted)


Speech:  Unremarkable


Language:  Adequate


Fund of Knowledge:  Adequate


Attention and Concentration:  Adequate


Memory:  Unremarkable (Grossly intact on clinical exam)


Mood:  Other (Calm)


Affect:  Blunt


Thought Process & Associations:  Linear (Within delusional system)


Thought Content:  Delusional


Hallucination Type:  None


Delusion Type:  Other (Jain)


Suicidal Ideation:  No (Denies SI but is injuring self by neglect)


Homicidal Ideation:  No (No HI voiced)


Insight:  Poor


Judgment:  Poor





Results


Labs











Test


  5/28/18


01:15


 


White Blood Count 8.3 TH/MM3 


 


Red Blood Count 4.34 MIL/MM3 


 


Hemoglobin 12.8 GM/DL 


 


Hematocrit 38.3 % 


 


Mean Corpuscular Volume 88.2 FL 


 


Mean Corpuscular Hemoglobin 29.6 PG 


 


Mean Corpuscular Hemoglobin


Concent 33.5 % 


 


 


Red Cell Distribution Width 14.5 % 


 


Platelet Count 228 TH/MM3 


 


Mean Platelet Volume 8.8 FL 


 


Neutrophils (%) (Auto) 68.3 % 


 


Lymphocytes (%) (Auto) 23.6 % 


 


Monocytes (%) (Auto) 7.5 % 


 


Eosinophils (%) (Auto) 0.3 % 


 


Basophils (%) (Auto) 0.3 % 


 


Neutrophils # (Auto) 5.7 TH/MM3 


 


Lymphocytes # (Auto) 2.0 TH/MM3 


 


Monocytes # (Auto) 0.6 TH/MM3 


 


Eosinophils # (Auto) 0.0 TH/MM3 


 


Basophils # (Auto) 0.0 TH/MM3 


 


CBC Comment DIFF FINAL 


 


Differential Comment  


 


Blood Urea Nitrogen 36 MG/DL 


 


Creatinine 1.05 MG/DL 


 


Random Glucose 117 MG/DL 


 


Total Protein 8.8 GM/DL 


 


Albumin 4.1 GM/DL 


 


Calcium Level 9.4 MG/DL 


 


Phosphorus Level 3.4 MG/DL 


 


Magnesium Level 2.5 MG/DL 


 


Alkaline Phosphatase 82 U/L 


 


Aspartate Amino Transf


(AST/SGOT) 27 U/L 


 


 


Alanine Aminotransferase


(ALT/SGPT) 31 U/L 


 


 


Total Bilirubin 0.8 MG/DL 


 


Sodium Level 140 MEQ/L 


 


Potassium Level 4.0 MEQ/L 


 


Chloride Level 105 MEQ/L 


 


Carbon Dioxide Level 26.2 MEQ/L 


 


Anion Gap 9 MEQ/L 


 


Estimat Glomerular Filtration


Rate 54 ML/MIN 


 


 


Total Creatine Kinase 78 U/L 


 


Troponin I


  LESS THAN 0.02


NG/ML


 


Triglycerides Level 82 MG/DL 


 


Cholesterol Level 276 MG/DL 


 


LDL Cholesterol 203 MG/DL 


 


HDL Cholesterol 56.6 MG/DL 


 


Cholesterol/HDL Ratio 4.87 RATIO 








Vitals/IOs





Vital Signs








  Date Time  Temp Pulse Resp B/P (MAP) Pulse Ox O2 Delivery O2 Flow Rate FiO2


 


5/28/18 06:46 98.2 89 16 125/54 (77) 96   


 


5/27/18 21:40        21


 


5/24/18 17:24      Room Air  











Assessment & Plan


Problem List:  


(1) Schizophrenia


ICD Codes:  F20.9 - Schizophrenia, unspecified


Status:  Acute


Assessment & Plan


Estimated LOS:  days patient continues psychotic and delusional.  Trying mixed 

compliance with medication.  For now continue treatment


Justification for Cont. Inpt.


At this time patient would decompensate place to a lower level of care


Discharge Planning


To be determined


Request HC Surrog/Guard Advoc?:  Yes





Problem Qualifiers





(1) Schizophrenia:  


Qualified Codes:  F20.0 - Paranoid schizophrenia








Sean Aleman MD May 28, 2018 08:51

## 2018-05-29 VITALS
OXYGEN SATURATION: 97 % | TEMPERATURE: 98 F | HEART RATE: 100 BPM | RESPIRATION RATE: 20 BRPM | SYSTOLIC BLOOD PRESSURE: 107 MMHG | DIASTOLIC BLOOD PRESSURE: 63 MMHG

## 2018-05-29 VITALS
TEMPERATURE: 97.9 F | HEART RATE: 95 BPM | OXYGEN SATURATION: 96 % | SYSTOLIC BLOOD PRESSURE: 107 MMHG | RESPIRATION RATE: 17 BRPM | DIASTOLIC BLOOD PRESSURE: 60 MMHG

## 2018-05-29 VITALS
SYSTOLIC BLOOD PRESSURE: 107 MMHG | OXYGEN SATURATION: 97 % | HEART RATE: 118 BPM | RESPIRATION RATE: 20 BRPM | DIASTOLIC BLOOD PRESSURE: 64 MMHG | TEMPERATURE: 98 F

## 2018-05-29 VITALS
RESPIRATION RATE: 16 BRPM | OXYGEN SATURATION: 97 % | SYSTOLIC BLOOD PRESSURE: 120 MMHG | TEMPERATURE: 99 F | HEART RATE: 89 BPM | DIASTOLIC BLOOD PRESSURE: 68 MMHG

## 2018-05-29 VITALS
OXYGEN SATURATION: 95 % | RESPIRATION RATE: 16 BRPM | DIASTOLIC BLOOD PRESSURE: 64 MMHG | SYSTOLIC BLOOD PRESSURE: 101 MMHG | TEMPERATURE: 98.2 F | HEART RATE: 100 BPM

## 2018-05-29 VITALS
DIASTOLIC BLOOD PRESSURE: 60 MMHG | HEART RATE: 93 BPM | OXYGEN SATURATION: 96 % | TEMPERATURE: 97.9 F | SYSTOLIC BLOOD PRESSURE: 99 MMHG | RESPIRATION RATE: 16 BRPM

## 2018-05-29 VITALS — SYSTOLIC BLOOD PRESSURE: 120 MMHG | DIASTOLIC BLOOD PRESSURE: 62 MMHG

## 2018-05-29 VITALS — DIASTOLIC BLOOD PRESSURE: 59 MMHG | HEART RATE: 89 BPM | SYSTOLIC BLOOD PRESSURE: 120 MMHG

## 2018-05-29 LAB
ALBUMIN SERPL-MCNC: 3.6 GM/DL (ref 3.4–5)
ALP SERPL-CCNC: 71 U/L (ref 45–117)
ALT SERPL-CCNC: 25 U/L (ref 10–53)
AST SERPL-CCNC: 17 U/L (ref 15–37)
BASOPHILS # BLD AUTO: 0 TH/MM3 (ref 0–0.2)
BASOPHILS NFR BLD: 0.4 % (ref 0–2)
BILIRUB SERPL-MCNC: 0.6 MG/DL (ref 0.2–1)
BUN SERPL-MCNC: 28 MG/DL (ref 7–18)
CALCIUM SERPL-MCNC: 9.1 MG/DL (ref 8.5–10.1)
CHLORIDE SERPL-SCNC: 108 MEQ/L (ref 98–107)
CREAT SERPL-MCNC: 0.9 MG/DL (ref 0.5–1)
EOSINOPHIL # BLD: 0 TH/MM3 (ref 0–0.4)
EOSINOPHIL NFR BLD: 0.6 % (ref 0–4)
ERYTHROCYTE [DISTWIDTH] IN BLOOD BY AUTOMATED COUNT: 14.4 % (ref 11.6–17.2)
GFR SERPLBLD BASED ON 1.73 SQ M-ARVRAT: 64 ML/MIN (ref 89–?)
GLUCOSE SERPL-MCNC: 83 MG/DL (ref 74–106)
HBA1C MFR BLD: 5.3 % (ref 4.3–6)
HCO3 BLD-SCNC: 24 MEQ/L (ref 21–32)
HCT VFR BLD CALC: 35.9 % (ref 35–46)
HGB BLD-MCNC: 12 GM/DL (ref 11.6–15.3)
LYMPHOCYTES # BLD AUTO: 2.8 TH/MM3 (ref 1–4.8)
LYMPHOCYTES NFR BLD AUTO: 46.9 % (ref 9–44)
MAGNESIUM SERPL-MCNC: 2.3 MG/DL (ref 1.5–2.5)
MCH RBC QN AUTO: 29.6 PG (ref 27–34)
MCHC RBC AUTO-ENTMCNC: 33.5 % (ref 32–36)
MCV RBC AUTO: 88.3 FL (ref 80–100)
MONOCYTE #: 0.4 TH/MM3 (ref 0–0.9)
MONOCYTES NFR BLD: 7.5 % (ref 0–8)
NEUTROPHILS # BLD AUTO: 2.6 TH/MM3 (ref 1.8–7.7)
NEUTROPHILS NFR BLD AUTO: 44.6 % (ref 16–70)
PHOSPHATE SERPL-MCNC: 3 MG/DL (ref 2.5–4.9)
PLATELET # BLD: 222 TH/MM3 (ref 150–450)
PMV BLD AUTO: 9.7 FL (ref 7–11)
PROT SERPL-MCNC: 8.2 GM/DL (ref 6.4–8.2)
RBC # BLD AUTO: 4.06 MIL/MM3 (ref 4–5.3)
SODIUM SERPL-SCNC: 143 MEQ/L (ref 136–145)
T4 FREE SERPL-MCNC: 0.95 NG/DL (ref 0.76–1.46)
WBC # BLD AUTO: 5.9 TH/MM3 (ref 4–11)

## 2018-05-29 RX ADMIN — ZIPRASIDONE HYDROCHLORIDE SCH MG: 60 CAPSULE ORAL at 17:43

## 2018-05-29 RX ADMIN — ZIPRASIDONE MESYLATE PRN MG: 20 INJECTION, POWDER, LYOPHILIZED, FOR SOLUTION INTRAMUSCULAR at 17:43

## 2018-05-29 RX ADMIN — ZIPRASIDONE HYDROCHLORIDE SCH MG: 60 CAPSULE ORAL at 07:26

## 2018-05-29 RX ADMIN — ZIPRASIDONE MESYLATE PRN MG: 20 INJECTION, POWDER, LYOPHILIZED, FOR SOLUTION INTRAMUSCULAR at 07:27

## 2018-05-29 NOTE — HHI.PYPN
Subjective


Chief Complaint:  Psychotic noncompliant medication night eating or drinking


Remarks


Patient seen in her room with nurse Patrick, chart reviewed, patient showing 

mixed compliance medication.  The patient discussed with nurse.  The patient no 

significant behavioral problems her oral intake still is very poor, she is 

refusing her oral psychotropics that she is getting the intramuscular injection 

as an alternative.  Patient remains vigilant.  For now continue treatment





Review of Systems


Except as stated in HPI:  all other systems reviewed are Neg





Mental Status Examination


Appearance:  Disheveled


Consciousness:  Alert


Orientation:  Person, Place (At least)


Motor Activity:  Other (No abnormal motor movements noted)


Speech:  Unremarkable


Language:  Adequate


Fund of Knowledge:  Adequate


Attention and Concentration:  Adequate


Memory:  Unremarkable (Grossly intact on clinical exam)


Mood:  Other (Calm)


Affect:  Blunt


Thought Process & Associations:  Linear (Within delusional system)


Thought Content:  Delusional


Hallucination Type:  None


Delusion Type:  Other (Synagogue)


Suicidal Ideation:  No (Denies SI but is injuring self by neglect)


Homicidal Ideation:  No (No HI voiced)


Insight:  Poor


Judgment:  Poor





Results


Labs











Test


  5/29/18


05:20


 


White Blood Count 5.9 TH/MM3 


 


Red Blood Count 4.06 MIL/MM3 


 


Hemoglobin 12.0 GM/DL 


 


Hematocrit 35.9 % 


 


Mean Corpuscular Volume 88.3 FL 


 


Mean Corpuscular Hemoglobin 29.6 PG 


 


Mean Corpuscular Hemoglobin


Concent 33.5 % 


 


 


Red Cell Distribution Width 14.4 % 


 


Platelet Count 222 TH/MM3 


 


Mean Platelet Volume 9.7 FL 


 


Neutrophils (%) (Auto) 44.6 % 


 


Lymphocytes (%) (Auto) 46.9 % 


 


Monocytes (%) (Auto) 7.5 % 


 


Eosinophils (%) (Auto) 0.6 % 


 


Basophils (%) (Auto) 0.4 % 


 


Neutrophils # (Auto) 2.6 TH/MM3 


 


Lymphocytes # (Auto) 2.8 TH/MM3 


 


Monocytes # (Auto) 0.4 TH/MM3 


 


Eosinophils # (Auto) 0.0 TH/MM3 


 


Basophils # (Auto) 0.0 TH/MM3 


 


CBC Comment DIFF FINAL 


 


Differential Comment  








Vitals/IOs





Vital Signs








  Date Time  Temp Pulse Resp B/P (MAP) Pulse Ox O2 Delivery O2 Flow Rate FiO2


 


5/29/18 06:00 99.0 89 16 120/68 (85) 97   


 


5/27/18 21:40        21











Assessment & Plan


Problem List:  


(1) Schizophrenia


ICD Codes:  F20.9 - Schizophrenia, unspecified


Status:  Acute


Assessment & Plan


Estimated LOS:  days patient remained psychotic somewhat paranoid and vigilant, 

noncompliant with psychotropics, for now continue treatment


Justification for Cont. Inpt.


At this time patient would decompensate a place to a lower level of care


Discharge Planning


To be determined


Request HC Surrog/Guard Advoc?:  Yes





Problem Qualifiers





(1) Schizophrenia:  


Qualified Codes:  F20.0 - Paranoid schizophrenia








Sean Aleman MD May 29, 2018 08:29

## 2018-05-29 NOTE — HHI.PR
Subjective


Remarks


Nursing denies any deterioration since last night.  Patient refuses for me to 

perform the physical exam on her.  I asked her why she says "because I just do 

not want to."  When I tell her that I need to ensure that her health is overall 

stable she says that she is okay.





Objective





Vital Signs








  Date Time  Temp Pulse Resp B/P (MAP) Pulse Ox O2 Delivery O2 Flow Rate FiO2


 


5/29/18 06:00 99.0 89 16 120/68 (85) 97   


 


5/28/18 18:22 97.0 89 16 104/56 (72) 94   


 


5/28/18 14:00 98.6 83 16 122/56 (78) 96   


 


5/28/18 10:00 98.7 85 16 110/59 (76) 96   














I/O      


 


 5/28/18 5/28/18 5/28/18 5/29/18 5/29/18 5/29/18





 07:00 15:00 23:00 07:00 15:00 23:00


 


Intake Total   1200 ml   


 


Balance   1200 ml   


 


      


 


Intake Oral   1200 ml   


 


# Voids 2   2  








Result Diagram:  


5/29/18 0520                                                                   

             5/29/18 0520





Objective Remarks


Middle-aged white female lying in bed, awake, alert, no acute distress, flat 

affect





A/P


Assessment and Plan


58-year-old female admitted secondary to unspecified psychosis





She is refusing to eat or take her medication.  Previous hospitalist has 

already spoken with psychiatry and at this time, they cannot force her to take 

them. From a medical standpoint, clinically she is stable and asymptomatic. 

Vitals reviewed and other than some tachycardia, they are stable. at this time, 

the medical team will sign off. Please reconsult as needed. 





Acute psychosis/General anxiety disorder/Bipolar disorder/Schizophrenia


   Management per psychiatry





Reentrant tachycardia, Anibal-Parkinson-White syndrome/History of chest pain/

Fatty liver disease/History of pyelonephritis/Multiple sclerosis


   No exacerbations of these conditions are seen thus far


   Follow clinically


   Follow for any evidence narcotic withdraw, given patient is not taking her 

baseline treatment presently.


   Atenolol but the pt is refusing


Discharge Planning


Nothing further to add medically.  Just needs to take her atenolol as 

compliantly as possible.  Otherwise stable to be transferred to regular 

psychiatry.











Alli Sethi MD May 29, 2018 09:34

## 2018-05-30 VITALS
HEART RATE: 98 BPM | DIASTOLIC BLOOD PRESSURE: 70 MMHG | TEMPERATURE: 98.4 F | OXYGEN SATURATION: 96 % | RESPIRATION RATE: 18 BRPM | SYSTOLIC BLOOD PRESSURE: 104 MMHG

## 2018-05-30 VITALS
HEART RATE: 88 BPM | TEMPERATURE: 98.1 F | SYSTOLIC BLOOD PRESSURE: 116 MMHG | OXYGEN SATURATION: 95 % | RESPIRATION RATE: 20 BRPM | DIASTOLIC BLOOD PRESSURE: 55 MMHG

## 2018-05-30 VITALS
TEMPERATURE: 97.9 F | OXYGEN SATURATION: 96 % | DIASTOLIC BLOOD PRESSURE: 62 MMHG | RESPIRATION RATE: 18 BRPM | HEART RATE: 93 BPM | SYSTOLIC BLOOD PRESSURE: 107 MMHG

## 2018-05-30 RX ADMIN — ZIPRASIDONE HYDROCHLORIDE SCH MG: 60 CAPSULE ORAL at 08:49

## 2018-05-30 RX ADMIN — ZIPRASIDONE HYDROCHLORIDE SCH MG: 60 CAPSULE ORAL at 18:00

## 2018-05-30 NOTE — HHI.PYPN
Subjective


Chief Complaint:  Psychotic noncompliant medication night eating or drinking


Remarks


Patient seen in her room nurse Patrick, chart reviewed, patient refusing all oral 

medications, patient discussed with nurse.  Patient is alert oriented to us 

refusing all oral medications including antipsychotics and mood stabilizers.  

Says she "fell out" after taking them and she is refusing.  Though she is 

taking the IM Geodon without problems.  When discussing the possibility of an 

and [place] sustain a to be used after 2-3 days acclamation with the oral patient 

refuses.  Patient is scheduled for [facility] Funding tomorrow.  With daughter's 

permission perhaps we can offer the patient 20 mg Geodon IM daily with home 

health care coming in to give the injection





Review of Systems


Except as stated in HPI:  all other systems reviewed are Neg





Mental Status Examination


Appearance:  Disheveled


Consciousness:  Alert


Orientation:  Person, Place (At least)


Motor Activity:  Other (No abnormal motor movements noted)


Speech:  Unremarkable


Language:  Adequate


Fund of Knowledge:  Adequate


Attention and Concentration:  Adequate


Memory:  Unremarkable (Grossly intact on clinical exam)


Mood:  Other (Calm)


Affect:  Blunt


Thought Process & Associations:  Linear (Within delusional system)


Thought Content:  Delusional


Hallucination Type:  None


Delusion Type:  Other (Sikhism)


Suicidal Ideation:  No (Denies SI but is injuring self by neglect)


Homicidal Ideation:  No (No HI voiced)


Insight:  Poor


Judgment:  Poor





Results


Vitals/IOs





Vital Signs








  Date Time  Temp Pulse Resp B/P (MAP) Pulse Ox O2 Delivery O2 Flow Rate FiO2


 


5/30/18 05:59 97.9 93 18 107/62 (77) 96   


 


5/27/18 21:40        21














Intake and Output   


 


 5/30/18 5/30/18 5/31/18





 08:00 16:00 00:00


 


Intake Total 0 ml  


 


Balance 0 ml  











Assessment & Plan


Problem List:  


(1) Schizophrenia


ICD Codes:  F20.9 - Schizophrenia, unspecified


Status:  Acute


Assessment & Plan


Estimated LOS:  days patient remains paranoid and delusional and psychotic, 

refusing all oral medications, oral intake is also mixed.  Patient scheduled 

for SS[facility] Funding tomorrow.


Justification for Cont. Inpt.


At this time patient would decompensate a place to a lower level of care


Discharge Planning


Possible return home with daughter if patient shows improvement and compliance 

with medication


Request HC Surrog/Guard Advoc?:  Yes





Problem Qualifiers





(1) Schizophrenia:  


Qualified Codes:  F20.0 - Paranoid schizophrenia








Sena Aleman MD May 30, 2018 10:21

## 2018-05-31 VITALS
HEART RATE: 102 BPM | OXYGEN SATURATION: 97 % | SYSTOLIC BLOOD PRESSURE: 105 MMHG | DIASTOLIC BLOOD PRESSURE: 56 MMHG | RESPIRATION RATE: 17 BRPM | TEMPERATURE: 98.7 F

## 2018-05-31 VITALS
HEART RATE: 97 BPM | DIASTOLIC BLOOD PRESSURE: 53 MMHG | OXYGEN SATURATION: 95 % | TEMPERATURE: 98.3 F | SYSTOLIC BLOOD PRESSURE: 101 MMHG | RESPIRATION RATE: 16 BRPM

## 2018-05-31 RX ADMIN — ZIPRASIDONE MESYLATE PRN MG: 20 INJECTION, POWDER, LYOPHILIZED, FOR SOLUTION INTRAMUSCULAR at 09:36

## 2018-05-31 RX ADMIN — ZIPRASIDONE HYDROCHLORIDE SCH MG: 60 CAPSULE ORAL at 17:37

## 2018-05-31 RX ADMIN — ZIPRASIDONE HYDROCHLORIDE SCH MG: 60 CAPSULE ORAL at 08:45

## 2018-05-31 RX ADMIN — ZIPRASIDONE HYDROCHLORIDE SCH MG: 60 CAPSULE ORAL at 08:47

## 2018-05-31 RX ADMIN — ZIPRASIDONE MESYLATE PRN MG: 20 INJECTION, POWDER, LYOPHILIZED, FOR SOLUTION INTRAMUSCULAR at 17:37

## 2018-05-31 NOTE — HHI.PYPN
Subjective


Chief Complaint:  Psychotic noncompliant medication night eating or drinking


Remarks


Patient seen [place] today patient retained by [place]  case 

continued 4 weeks healthcare surrogate appointment.  Patient calm and court 

did not participate.  Patient seen in her room continues to refuse to take all 

medications of nursing or transverse alternatives to increase cooperation.  We 

will monitor that for now





Review of Systems


Except as stated in HPI:  all other systems reviewed are Neg





Mental Status Examination


Appearance:  Disheveled


Consciousness:  Alert


Orientation:  Person, Place (At least)


Motor Activity:  Other (No abnormal motor movements noted)


Speech:  Unremarkable


Language:  Adequate


Fund of Knowledge:  Adequate


Attention and Concentration:  Adequate


Memory:  Unremarkable (Grossly intact on clinical exam)


Mood:  Other (Calm)


Affect:  Blunt


Thought Process & Associations:  Linear (Within delusional system)


Thought Content:  Delusional


Hallucination Type:  None


Delusion Type:  Other (Pentecostal)


Suicidal Ideation:  No (Denies SI but is injuring self by neglect)


Homicidal Ideation:  No (No HI voiced)


Insight:  Poor


Judgment:  Poor





Results


Vitals/IOs





Vital Signs








  Date Time  Temp Pulse Resp B/P (MAP) Pulse Ox O2 Delivery O2 Flow Rate FiO2


 


5/31/18 06:01 98.3 97 16 101/53 (69) 95   


 


5/27/18 21:40        21











Assessment & Plan


Problem List:  


(1) Schizophrenia


ICD Codes:  F20.9 - Schizophrenia, unspecified


Status:  Acute


Assessment & Plan


Estimated LOS:  days patient continues psychotic delusional with poor 

compliance medication refusing oral meds


Justification for Cont. Inpt.


At this time patient would decompensate a place to the lower level of care


Discharge Planning


To be determined


Request HC Surrog/Guard Advoc?:  Yes





Problem Qualifiers





(1) Schizophrenia:  


Qualified Codes:  F20.0 - Paranoid schizophrenia








Sean Aleman MD May 31, 2018 10:17

## 2018-06-01 VITALS
RESPIRATION RATE: 16 BRPM | SYSTOLIC BLOOD PRESSURE: 92 MMHG | TEMPERATURE: 97.8 F | DIASTOLIC BLOOD PRESSURE: 60 MMHG | OXYGEN SATURATION: 98 % | HEART RATE: 97 BPM

## 2018-06-01 VITALS
RESPIRATION RATE: 15 BRPM | SYSTOLIC BLOOD PRESSURE: 97 MMHG | OXYGEN SATURATION: 97 % | DIASTOLIC BLOOD PRESSURE: 60 MMHG | HEART RATE: 90 BPM | TEMPERATURE: 98.7 F

## 2018-06-01 RX ADMIN — ZIPRASIDONE MESYLATE PRN MG: 20 INJECTION, POWDER, LYOPHILIZED, FOR SOLUTION INTRAMUSCULAR at 10:04

## 2018-06-01 RX ADMIN — ZIPRASIDONE HYDROCHLORIDE SCH MG: 60 CAPSULE ORAL at 10:04

## 2018-06-01 RX ADMIN — ZIPRASIDONE MESYLATE PRN MG: 20 INJECTION, POWDER, LYOPHILIZED, FOR SOLUTION INTRAMUSCULAR at 17:29

## 2018-06-01 RX ADMIN — ZIPRASIDONE HYDROCHLORIDE SCH MG: 60 CAPSULE ORAL at 17:28

## 2018-06-01 NOTE — HHI.PYPN
Subjective


Chief Complaint:  Psychotic noncompliant medication night eating or drinking


Remarks


Patient seen in her room with nurse, chart reviewed, patient showing mixed 

compliance with medication, continues to refuse oral medication but accepting 

IM medications without resistance.  Patient denies suicidality homicidality, 

acknowledges still some persistent voices.  For now continue treatment no 

change.  She is showing somewhat less in the way of the paranoia.  I wonder if 

perhaps there is some softening of her psychosis





Review of Systems


Except as stated in HPI:  all other systems reviewed are Neg





Mental Status Examination


Appearance:  Disheveled


Consciousness:  Alert


Orientation:  Person, Place (At least)


Motor Activity:  Other (No abnormal motor movements noted)


Speech:  Unremarkable


Language:  Adequate


Fund of Knowledge:  Adequate


Attention and Concentration:  Adequate


Memory:  Unremarkable (Grossly intact on clinical exam)


Mood:  Other (Calm)


Affect:  Blunt


Thought Process & Associations:  Linear (Within delusional system)


Thought Content:  Delusional


Hallucination Type:  None


Delusion Type:  Other ([place])


Suicidal Ideation:  No (Denies SI but is injuring self by neglect)


Homicidal Ideation:  No (No HI voiced)


Insight:  Poor


Judgment:  Poor





Results


Vitals/IOs





Vital Signs








  Date Time  Temp Pulse Resp B/P (MAP) Pulse Ox O2 Delivery O2 Flow Rate FiO2


 


6/1/18 04:46 98.7 90 15 97/60 (72) 97   














Intake and Output   


 


 6/1/18 6/1/18 6/2/18





 08:00 16:00 00:00


 


Intake Total 60 ml  


 


Balance 60 ml  











Assessment & Plan


Problem List:  


(1) Schizophrenia


ICD Codes:  F20.9 - Schizophrenia, unspecified


Status:  Acute


Assessment & Plan


Estimated LOS:  days patient remains psychotic and delusional, although it 

appears to be perhaps softening somewhat.  Continues to refuse all medications 

continues to accept IM meds without problems will increase her a.m. Geodon to 

15 mg


Justification for Cont. Inpt.


At this time patient would decompensate a place to a lower level of care


Discharge Planning


To be determined


Request HC Surrog/Guard Advoc?:  Yes





Problem Qualifiers





(1) Schizophrenia:  


Qualified Codes:  F20.0 - Paranoid schizophrenia








Saen Aelman MD Jun 1, 2018 08:54

## 2018-06-02 VITALS
DIASTOLIC BLOOD PRESSURE: 69 MMHG | HEART RATE: 101 BPM | OXYGEN SATURATION: 96 % | RESPIRATION RATE: 15 BRPM | TEMPERATURE: 98.4 F | SYSTOLIC BLOOD PRESSURE: 115 MMHG

## 2018-06-02 VITALS
OXYGEN SATURATION: 98 % | DIASTOLIC BLOOD PRESSURE: 59 MMHG | RESPIRATION RATE: 15 BRPM | TEMPERATURE: 97 F | HEART RATE: 94 BPM | SYSTOLIC BLOOD PRESSURE: 96 MMHG

## 2018-06-02 RX ADMIN — ZIPRASIDONE HYDROCHLORIDE SCH MG: 60 CAPSULE ORAL at 08:13

## 2018-06-02 RX ADMIN — ZIPRASIDONE MESYLATE PRN MG: 20 INJECTION, POWDER, LYOPHILIZED, FOR SOLUTION INTRAMUSCULAR at 08:13

## 2018-06-02 RX ADMIN — ZIPRASIDONE MESYLATE PRN MG: 20 INJECTION, POWDER, LYOPHILIZED, FOR SOLUTION INTRAMUSCULAR at 18:22

## 2018-06-02 RX ADMIN — ZIPRASIDONE HYDROCHLORIDE SCH MG: 60 CAPSULE ORAL at 18:00

## 2018-06-02 NOTE — HHI.PYPN
Subjective


Chief Complaint:  Psychotic noncompliant medication night eating or drinking


Remarks


Patient seen in her room with nurse, chart reviewed, patient continues 

compliant medications needing IM doses of psychotropics.  The patient is calm 

with me continues to show this paranoia and delusions relating to oral 

medications





Review of Systems


Except as stated in HPI:  all other systems reviewed are Neg





Mental Status Examination


Appearance:  Disheveled


Consciousness:  Alert


Orientation:  Person, Place (At least)


Motor Activity:  Other (No abnormal motor movements noted)


Speech:  Unremarkable


Language:  Adequate


Fund of Knowledge:  Adequate


Attention and Concentration:  Adequate


Memory:  Unremarkable (Grossly intact on clinical exam)


Mood:  Other (Calm)


Affect:  Blunt


Thought Process & Associations:  Linear (Within delusional system)


Thought Content:  Delusional


Hallucination Type:  None


Delusion Type:  Other (Anglican)


Suicidal Ideation:  No (Denies SI but is injuring self by neglect)


Homicidal Ideation:  No (No HI voiced)


Insight:  Poor


Judgment:  Poor





Results


Vitals/IOs





Vital Signs








  Date Time  Temp Pulse Resp B/P (MAP) Pulse Ox O2 Delivery O2 Flow Rate FiO2


 


6/2/18 04:36 97.0 94 15 96/59 (71) 98   














Intake and Output   


 


 6/2/18 6/2/18 6/3/18





 08:00 16:00 00:00


 


Intake Total 720 ml 1200 ml 


 


Balance 720 ml 1200 ml 











Assessment & Plan


Problem List:  


(1) Schizophrenia


ICD Codes:  F20.9 - Schizophrenia, unspecified


Status:  Acute


Assessment & Plan


Estimated LOS:  days patient continues psychotic delusional noncompliant 

medications needing IM doses of her psychotropics


Justification for Cont. Inpt.


At this time patient would decompensate and placed on a lower level of care


Discharge Planning


To be determined


Request HC Surrog/Guard Advoc?:  Yes





Problem Qualifiers





(1) Schizophrenia:  


Qualified Codes:  F20.0 - Paranoid schizophrenia








Sean Aleman MD Jun 2, 2018 17:23

## 2018-06-03 VITALS
HEART RATE: 91 BPM | DIASTOLIC BLOOD PRESSURE: 72 MMHG | OXYGEN SATURATION: 98 % | RESPIRATION RATE: 15 BRPM | SYSTOLIC BLOOD PRESSURE: 111 MMHG | TEMPERATURE: 99 F

## 2018-06-03 VITALS
RESPIRATION RATE: 16 BRPM | OXYGEN SATURATION: 96 % | TEMPERATURE: 97.2 F | DIASTOLIC BLOOD PRESSURE: 65 MMHG | SYSTOLIC BLOOD PRESSURE: 104 MMHG | HEART RATE: 96 BPM

## 2018-06-03 RX ADMIN — ZIPRASIDONE HYDROCHLORIDE SCH MG: 60 CAPSULE ORAL at 17:50

## 2018-06-03 RX ADMIN — ZIPRASIDONE MESYLATE PRN MG: 20 INJECTION, POWDER, LYOPHILIZED, FOR SOLUTION INTRAMUSCULAR at 17:50

## 2018-06-03 RX ADMIN — ZIPRASIDONE HYDROCHLORIDE SCH MG: 60 CAPSULE ORAL at 08:15

## 2018-06-03 RX ADMIN — ZIPRASIDONE MESYLATE PRN MG: 20 INJECTION, POWDER, LYOPHILIZED, FOR SOLUTION INTRAMUSCULAR at 08:15

## 2018-06-03 NOTE — HHI.PYPN
Subjective


Chief Complaint:  Psychotic noncompliant medication night eating or drinking


Remarks


Patient seen in her room with nurse Keshia, chart reviewed, patient continues 

to refuse any oral medication, which is cooperative with the injections.  

Patient's paranoia and frustration irritability are softer though her delusions 

related to pills persists.  However she was able to talk to me today about the 

possibility of switching to an oral Resporal preparation in anticipation of a 

monthly injection for now continue treatment





Review of Systems


Except as stated in HPI:  all other systems reviewed are Neg





Mental Status Examination


Appearance:  Disheveled


Consciousness:  Alert


Orientation:  Person, Place (At least)


Motor Activity:  Other (No abnormal motor movements noted)


Speech:  Unremarkable


Language:  Adequate


Fund of Knowledge:  Adequate


Attention and Concentration:  Adequate


Memory:  Unremarkable (Grossly intact on clinical exam)


Mood:  Other (Calm)


Affect:  Blunt


Thought Process & Associations:  Linear (Within delusional system)


Thought Content:  Delusional


Hallucination Type:  None


Delusion Type:  Other (Judaism)


Suicidal Ideation:  No (Denies SI but is injuring self by neglect)


Homicidal Ideation:  No (No HI voiced)


Insight:  Poor


Judgment:  Poor





Results


Vitals/IOs





Vital Signs








  Date Time  Temp Pulse Resp B/P (MAP) Pulse Ox O2 Delivery O2 Flow Rate FiO2


 


6/3/18 05:26 99.0 91 15 111/72 (85) 98   














Intake and Output   


 


 6/3/18 6/3/18 6/4/18





 08:00 16:00 00:00


 


Intake Total 60 ml  


 


Balance 60 ml  











Assessment & Plan


Problem List:  


(1) Schizophrenia


ICD Codes:  F20.9 - Schizophrenia, unspecified


Status:  Acute


Assessment & Plan


Estimated LOS:  days patient continues psychotic and delusional but slightly 

softer.  She continues to refuse oral medication we will continue with the IM 

injections


Justification for Cont. Inpt.


At this time patient would decompensate a place to a lower level of care


Discharge Planning


To be determined


Request HC Surrog/Guard Advoc?:  Yes





Problem Qualifiers





(1) Schizophrenia:  


Qualified Codes:  F20.0 - Paranoid schizophrenia








Sean Aleman MD Kvng 3, 2018 11:44

## 2018-06-04 VITALS
DIASTOLIC BLOOD PRESSURE: 50 MMHG | SYSTOLIC BLOOD PRESSURE: 94 MMHG | RESPIRATION RATE: 15 BRPM | HEART RATE: 92 BPM | OXYGEN SATURATION: 97 % | TEMPERATURE: 98.4 F

## 2018-06-04 VITALS
DIASTOLIC BLOOD PRESSURE: 50 MMHG | HEART RATE: 80 BPM | SYSTOLIC BLOOD PRESSURE: 130 MMHG | TEMPERATURE: 97.8 F | OXYGEN SATURATION: 93 % | RESPIRATION RATE: 18 BRPM

## 2018-06-04 RX ADMIN — ZIPRASIDONE HYDROCHLORIDE SCH MG: 60 CAPSULE ORAL at 09:43

## 2018-06-04 RX ADMIN — ZIPRASIDONE HYDROCHLORIDE SCH MG: 60 CAPSULE ORAL at 18:00

## 2018-06-04 RX ADMIN — ZIPRASIDONE MESYLATE PRN MG: 20 INJECTION, POWDER, LYOPHILIZED, FOR SOLUTION INTRAMUSCULAR at 09:43

## 2018-06-04 NOTE — [email or web address]
Subjective


Chief Complaint:  Psychotic noncompliant medication night eating or drinking


Remarks


Patient is seen in her room with medical student Valentina, chart reviewed, patient 

continues to refuse oral medication.  Though she was showing no resistance to 

the intramuscular Geodon.  Patient continues calm and pleasant with me her 

paranoia and vigilance has softened.  We will continue to discuss her being 

willing to take a trial of Resporal or in [place] this is a patient of the long-

acting injection.  She continues to states she will "think about"





Review of Systems


Except as stated in HPI:  all other systems reviewed are Neg





Mental Status Examination


Appearance:  Disheveled


Consciousness:  Alert


Orientation:  Person, Place (At least)


Motor Activity:  Other (No abnormal motor movements noted)


Speech:  Unremarkable


Language:  Adequate


Fund of Knowledge:  Adequate


Attention and Concentration:  Adequate


Memory:  Unremarkable (Grossly intact on clinical exam)


Mood:  Other (Calm)


Affect:  Blunt


Thought Process & Associations:  Linear (Within delusional system)


Thought Content:  Delusional


Hallucination Type:  None


Delusion Type:  Other (Mormonism)


Suicidal Ideation:  No (Denies SI but is injuring self by neglect)


Homicidal Ideation:  No (No HI voiced)


Insight:  Poor


Judgment:  Poor





Results


Vitals/IOs





Vital Signs








  Date Time  Temp Pulse Resp B/P (MAP) Pulse Ox O2 Delivery O2 Flow Rate FiO2


 


6/4/18 06:00 98.4 92 15 94/50 (65) 97   














Intake and Output   


 


 6/4/18 6/4/18 6/5/18





 08:00 16:00 00:00


 


Intake Total 0 ml  


 


Balance 0 ml  











Assessment & Plan


Problem List:  


(1) Schizophrenia


ICD Codes:  F20.9 - Schizophrenia, unspecified


Status:  Acute


Assessment & Plan


Estimated LOS:  days patient continues psychotic and delusional, refusing oral 

medication but no behavioral problem.  For now continue treatment continue to 

work with patient again willingness to try oral Resporal


Justification for Cont. Inpt.


At this time patient would decompensate a place to a lower level of care


Discharge Planning


To be determined


Request HC Surrog/Guard Advoc?:  Yes





Problem Qualifiers





(1) Schizophrenia:  


Qualified Codes:  F20.0 - Paranoid schizophrenia








Sean Aleman MD Jun 4, 2018 15:12

## 2018-06-05 VITALS
HEART RATE: 81 BPM | SYSTOLIC BLOOD PRESSURE: 101 MMHG | TEMPERATURE: 97.8 F | DIASTOLIC BLOOD PRESSURE: 75 MMHG | RESPIRATION RATE: 18 BRPM

## 2018-06-05 VITALS
HEART RATE: 95 BPM | RESPIRATION RATE: 16 BRPM | TEMPERATURE: 98.1 F | SYSTOLIC BLOOD PRESSURE: 96 MMHG | OXYGEN SATURATION: 95 % | DIASTOLIC BLOOD PRESSURE: 65 MMHG

## 2018-06-05 RX ADMIN — ZIPRASIDONE HYDROCHLORIDE SCH MG: 60 CAPSULE ORAL at 18:00

## 2018-06-05 RX ADMIN — OXYCODONE HYDROCHLORIDE AND ACETAMINOPHEN PRN TAB: 10; 325 TABLET ORAL at 20:46

## 2018-06-05 RX ADMIN — ZIPRASIDONE MESYLATE PRN MG: 20 INJECTION, POWDER, LYOPHILIZED, FOR SOLUTION INTRAMUSCULAR at 18:00

## 2018-06-05 RX ADMIN — ZIPRASIDONE HYDROCHLORIDE SCH MG: 60 CAPSULE ORAL at 09:00

## 2018-06-05 NOTE — [email or web address]
Subjective


Chief Complaint:  Psychotic noncompliant medication night eating or drinking


Remarks


Patient seen in her room with medical student Valentina, chart reviewed, patient 

continues to refuse oral antipsychotic, has been receiving Geodon IM for 3-1/2 

days.  She is overall calm cooperative with me except for this instance.  Her 

oral intake is also somewhat fluctuant.  Patient states she occasionally wants 

to "fast" for a day or so.  Showing no insight.  For now continue treatment we 

will check labs in the morning





Review of Systems


Except as stated in HPI:  all other systems reviewed are Neg





Mental Status Examination


Appearance:  Disheveled


Consciousness:  Alert


Orientation:  Person, Place (At least)


Motor Activity:  Other (No abnormal motor movements noted)


Speech:  Unremarkable


Language:  Adequate


Fund of Knowledge:  Adequate


Attention and Concentration:  Adequate


Memory:  Unremarkable (Grossly intact on clinical exam)


Mood:  Other (Calm)


Affect:  Blunt


Thought Process & Associations:  Linear (Within delusional system)


Thought Content:  Delusional


Hallucination Type:  None


Delusion Type:  Other (Nondenominational)


Suicidal Ideation:  No (Denies SI but is injuring self by neglect)


Homicidal Ideation:  No (No HI voiced)


Insight:  Poor


Judgment:  Poor





Results


Vitals/IOs





Vital Signs








  Date Time  Temp Pulse Resp B/P (MAP) Pulse Ox O2 Delivery O2 Flow Rate FiO2


 


6/5/18 06:00 98.1 95 16 96/65 (75) 95   














Intake and Output   


 


 6/5/18 6/5/18 6/6/18





 08:00 16:00 00:00


 


Intake Total  240 ml 


 


Balance  240 ml 











Assessment & Plan


Problem List:  


(1) Schizophrenia


ICD Codes:  F20.9 - Schizophrenia, unspecified


Status:  Acute


Assessment & Plan


Estimated LOS:  days patient continues delusional and psychotic, not complaint 

medications, for now continue treatment with check labs in a.m.


Justification for Cont. Inpt.


At this time patient would decompensate a place to the lower level of care


Discharge Planning


To be determined


Request HC Surrog/Guard Advoc?:  Yes





Problem Qualifiers





(1) Schizophrenia:  


Qualified Codes:  F20.0 - Paranoid schizophrenia








Sean Aleman MD Jun 5, 2018 13:55

## 2018-06-06 VITALS
RESPIRATION RATE: 18 BRPM | HEART RATE: 77 BPM | TEMPERATURE: 98.1 F | OXYGEN SATURATION: 100 % | SYSTOLIC BLOOD PRESSURE: 119 MMHG | DIASTOLIC BLOOD PRESSURE: 68 MMHG

## 2018-06-06 VITALS
HEART RATE: 97 BPM | DIASTOLIC BLOOD PRESSURE: 60 MMHG | SYSTOLIC BLOOD PRESSURE: 108 MMHG | TEMPERATURE: 98.1 F | RESPIRATION RATE: 18 BRPM | OXYGEN SATURATION: 99 %

## 2018-06-06 LAB
ALBUMIN SERPL-MCNC: 3.6 GM/DL (ref 3.4–5)
ALP SERPL-CCNC: 80 U/L (ref 45–117)
ALT SERPL-CCNC: 21 U/L (ref 10–53)
AST SERPL-CCNC: 12 U/L (ref 15–37)
BASOPHILS # BLD AUTO: 0 TH/MM3 (ref 0–0.2)
BASOPHILS NFR BLD: 0.6 % (ref 0–2)
BILIRUB SERPL-MCNC: 0.6 MG/DL (ref 0.2–1)
BUN SERPL-MCNC: 21 MG/DL (ref 7–18)
CALCIUM SERPL-MCNC: 9.6 MG/DL (ref 8.5–10.1)
CARBAMAZEPINE (TEGRETOL): (no result) MCG/ML (ref 4–12)
CHLORIDE SERPL-SCNC: 106 MEQ/L (ref 98–107)
CREAT SERPL-MCNC: 0.86 MG/DL (ref 0.5–1)
EOSINOPHIL # BLD: 0.1 TH/MM3 (ref 0–0.4)
EOSINOPHIL NFR BLD: 1.2 % (ref 0–4)
ERYTHROCYTE [DISTWIDTH] IN BLOOD BY AUTOMATED COUNT: 14.4 % (ref 11.6–17.2)
GFR SERPLBLD BASED ON 1.73 SQ M-ARVRAT: 68 ML/MIN (ref 89–?)
GLUCOSE SERPL-MCNC: 87 MG/DL (ref 74–106)
HCO3 BLD-SCNC: 21.2 MEQ/L (ref 21–32)
HCT VFR BLD CALC: 39 % (ref 35–46)
HGB BLD-MCNC: 13.1 GM/DL (ref 11.6–15.3)
LYMPHOCYTES # BLD AUTO: 2.5 TH/MM3 (ref 1–4.8)
LYMPHOCYTES NFR BLD AUTO: 34.4 % (ref 9–44)
MCH RBC QN AUTO: 29.3 PG (ref 27–34)
MCHC RBC AUTO-ENTMCNC: 33.4 % (ref 32–36)
MCV RBC AUTO: 87.5 FL (ref 80–100)
MONOCYTE #: 0.4 TH/MM3 (ref 0–0.9)
MONOCYTES NFR BLD: 5.7 % (ref 0–8)
NEUTROPHILS # BLD AUTO: 4.2 TH/MM3 (ref 1.8–7.7)
NEUTROPHILS NFR BLD AUTO: 58.1 % (ref 16–70)
PLATELET # BLD: 233 TH/MM3 (ref 150–450)
PMV BLD AUTO: 10 FL (ref 7–11)
PROT SERPL-MCNC: 8 GM/DL (ref 6.4–8.2)
RBC # BLD AUTO: 4.46 MIL/MM3 (ref 4–5.3)
SODIUM SERPL-SCNC: 138 MEQ/L (ref 136–145)
WBC # BLD AUTO: 7.1 TH/MM3 (ref 4–11)

## 2018-06-06 RX ADMIN — ZIPRASIDONE HYDROCHLORIDE SCH MG: 60 CAPSULE ORAL at 09:00

## 2018-06-06 RX ADMIN — ZIPRASIDONE MESYLATE PRN MG: 20 INJECTION, POWDER, LYOPHILIZED, FOR SOLUTION INTRAMUSCULAR at 09:00

## 2018-06-06 RX ADMIN — ZIPRASIDONE MESYLATE PRN MG: 20 INJECTION, POWDER, LYOPHILIZED, FOR SOLUTION INTRAMUSCULAR at 18:00

## 2018-06-06 RX ADMIN — OXYCODONE HYDROCHLORIDE AND ACETAMINOPHEN PRN TAB: 10; 325 TABLET ORAL at 02:38

## 2018-06-06 RX ADMIN — ZIPRASIDONE HYDROCHLORIDE SCH MG: 60 CAPSULE ORAL at 18:00

## 2018-06-06 NOTE — PD.TTN
Patient Problems


1. Discharge planning


2. Medication compliance


3. Knowledge deficit


4. Lack of coping skills





Progress Toward Goals


Provider Present:  Dr. MARY ANN Aleman


Provider Input:  


Dr. Aleman's treatment team met to discuss patient's treatment plan,


discharge, and medication. Patient is still psychotic but becoming softer.


Still adjusting medication.


Nurse(s) Input:  


Patient's nurse states, "not eating or drinking, paranoid, non compliant


with oral meds but will take shots


Psychiatric Counselors Present:  Smitha Wadsworth [facility]


Psych Therapist Input:  


Patient presented childlike, depressed, seclusive, affect flat. Patient's


speech was clear, organized. Patient reports not wanting to take oral


medication, but will take injections.


Group Spec/RT/OT/ROCK Present:  SHWETA Spencer


Group Spec/RT/OT/ROCK Input:  


Patient does not attend any groups











Smitha Wadsworth [facility] Jun 6, 2018 13:47

## 2018-06-06 NOTE — HHI.PYPN
Subjective


Chief Complaint:  Psychotic noncompliant medication night eating or drinking


Remarks


Patient seen in her room with floor staff, chart reviewed, patient discussed 

with nurse, patient continues to refuse any oral medications.  Though she is 

not resistant to the IM alternative.  Continue to discuss the option of the 

Resporal orally for 2-3 days followed by a long-acting injection.  She states 

she will consider that.  Patient scheduled for [address] tomorrow





Review of Systems


Except as stated in HPI:  all other systems reviewed are Neg





Mental Status Examination


Appearance:  Disheveled


Consciousness:  Alert


Orientation:  Person, Place (At least)


Motor Activity:  Other (No abnormal motor movements noted)


Speech:  Unremarkable


Language:  Adequate


Fund of Knowledge:  Adequate


Attention and Concentration:  Adequate


Memory:  Unremarkable (Grossly intact on clinical exam)


Mood:  Other (Calm)


Affect:  Blunt


Thought Process & Associations:  Linear (Within delusional system)


Thought Content:  Delusional


Hallucination Type:  None


Delusion Type:  Other (Restoration)


Suicidal Ideation:  No (Denies SI but is injuring self by neglect)


Homicidal Ideation:  No (No HI voiced)


Insight:  Poor


Judgment:  Poor





Results


Labs











Test


  6/6/18


06:51 6/6/18


06:58


 


Blood Urea Nitrogen 21 MG/DL  


 


Creatinine 0.86 MG/DL  


 


Random Glucose 87 MG/DL  


 


Total Protein 8.0 GM/DL  


 


Albumin 3.6 GM/DL  


 


Calcium Level 9.6 MG/DL  


 


Alkaline Phosphatase 80 U/L  


 


Aspartate Amino Transf


(AST/SGOT) 12 U/L 


  


 


 


Alanine Aminotransferase


(ALT/SGPT) 21 U/L 


  


 


 


Total Bilirubin 0.6 MG/DL  


 


Sodium Level 138 MEQ/L  


 


Potassium Level 3.7 MEQ/L  


 


Chloride Level 106 MEQ/L  


 


Carbon Dioxide Level 21.2 MEQ/L  


 


Anion Gap 11 MEQ/L  


 


Estimat Glomerular Filtration


Rate 68 ML/MIN 


  


 


 


Carbamazepine (Tegretol) Level


  LESS THAN 0.5


MCG/ML 


 


 


White Blood Count  7.1 TH/MM3 


 


Red Blood Count  4.46 MIL/MM3 


 


Hemoglobin  13.1 GM/DL 


 


Hematocrit  39.0 % 


 


Mean Corpuscular Volume  87.5 FL 


 


Mean Corpuscular Hemoglobin  29.3 PG 


 


Mean Corpuscular Hemoglobin


Concent 


  33.4 % 


 


 


Red Cell Distribution Width  14.4 % 


 


Platelet Count  233 TH/MM3 


 


Mean Platelet Volume  10.0 FL 


 


Neutrophils (%) (Auto)  58.1 % 


 


Lymphocytes (%) (Auto)  34.4 % 


 


Monocytes (%) (Auto)  5.7 % 


 


Eosinophils (%) (Auto)  1.2 % 


 


Basophils (%) (Auto)  0.6 % 


 


Neutrophils # (Auto)  4.2 TH/MM3 


 


Lymphocytes # (Auto)  2.5 TH/MM3 


 


Monocytes # (Auto)  0.4 TH/MM3 


 


Eosinophils # (Auto)  0.1 TH/MM3 


 


Basophils # (Auto)  0.0 TH/MM3 


 


CBC Comment  DIFF FINAL 


 


Differential Comment   


 


Hematology Comments   








Vitals/IOs





Vital Signs








  Date Time  Temp Pulse Resp B/P (MAP) Pulse Ox O2 Delivery O2 Flow Rate FiO2


 


6/6/18 05:44 98.1 97 18 108/67 (81) 99   





    95/60 (72)    





    105/67 (80)    











Assessment & Plan


Problem List:  


(1) Schizophrenia


ICD Codes:  F20.9 - Schizophrenia, unspecified


Status:  Acute


Assessment & Plan


Estimated LOS:  days patient continues delusional, noncompliant with oral 

medications, did have labs drawn this morning to monitor her metabolic issues 

patient scheduled for [address] tomorrow


Justification for Cont. Inpt.


At this time patient would decompensate a place to a lower level of care


Discharge Planning


To be determined


Request HC Surrog/Guard Advoc?:  Yes





Problem Qualifiers





(1) Schizophrenia:  


Qualified Codes:  F20.0 - Paranoid schizophrenia








Sean Aleman MD Jun 6, 2018 08:58

## 2018-06-07 VITALS
TEMPERATURE: 98.1 F | SYSTOLIC BLOOD PRESSURE: 109 MMHG | OXYGEN SATURATION: 94 % | RESPIRATION RATE: 18 BRPM | HEART RATE: 94 BPM | DIASTOLIC BLOOD PRESSURE: 60 MMHG

## 2018-06-07 VITALS
RESPIRATION RATE: 17 BRPM | DIASTOLIC BLOOD PRESSURE: 68 MMHG | TEMPERATURE: 97.2 F | HEART RATE: 118 BPM | SYSTOLIC BLOOD PRESSURE: 105 MMHG

## 2018-06-07 RX ADMIN — ZIPRASIDONE MESYLATE PRN MG: 20 INJECTION, POWDER, LYOPHILIZED, FOR SOLUTION INTRAMUSCULAR at 09:42

## 2018-06-07 RX ADMIN — ZIPRASIDONE HYDROCHLORIDE SCH MG: 60 CAPSULE ORAL at 09:00

## 2018-06-07 NOTE — HHI.PYPN
Subjective


Chief Complaint:  Psychotic noncompliant medication night eating or drinking


Remarks


Patient seen today in day room with nurse Willy, and medical student Keisha, 

chart review, patient continues to refuse all medication.  However today 

patient stated she would take the pill.  I discussed the process with her.  We 

will place the Geodon on hold we will offer the patient 3 mg Resporal at 

bedtime through the weekend if she tolerates it well consider the addition of 

the Invega sustained on Monday 6/11





Review of Systems


Except as stated in HPI:  all other systems reviewed are Neg





Mental Status Examination


Appearance:  Disheveled


Consciousness:  Alert


Orientation:  Person, Place (At least)


Motor Activity:  Other (No abnormal motor movements noted)


Speech:  Unremarkable


Language:  Adequate


Fund of Knowledge:  Adequate


Attention and Concentration:  Adequate


Memory:  Unremarkable (Grossly intact on clinical exam)


Mood:  Other (Calm)


Affect:  Blunt


Thought Process & Associations:  Linear (Within delusional system)


Thought Content:  Delusional


Hallucination Type:  None


Delusion Type:  Other (Congregation)


Suicidal Ideation:  No (Denies SI but is injuring self by neglect)


Homicidal Ideation:  No (No HI voiced)


Insight:  Poor


Judgment:  Poor





Results


Vitals/IOs





Vital Signs








  Date Time  Temp Pulse Resp B/P (MAP) Pulse Ox O2 Delivery O2 Flow Rate FiO2


 


6/7/18 05:42 98.1 94 18 109/56 (73) 94   





    103/63 (76)    





    84/60 (68)    














Intake and Output   


 


 6/7/18 6/7/18 6/8/18





 08:00 16:00 00:00


 


Intake Total 0 ml  


 


Balance 0 ml  











Assessment & Plan


Problem List:  


(1) Schizophrenia


ICD Codes:  F20.9 - Schizophrenia, unspecified


Status:  Acute


Assessment & Plan


Estimated LOS:  days patient continues quite delusional psychotic throat is 

softening somewhat.  She now appears willing to take the oral Resporal with 

anticipation of the Invega sustained.  We will place the Geodon on hold however 

if patient refuses the oral Resporal she can then receive the IM Geodon


Justification for Cont. Inpt.


At this time patient would decompensate a place to a lower level of care


Discharge Planning


To be determined


Request HC Surrog/Guard Advoc?:  Yes





Problem Qualifiers





(1) Schizophrenia:  


Qualified Codes:  F20.0 - Paranoid schizophrenia








Sean Aleman MD Jun 7, 2018 13:54

## 2018-06-08 VITALS
SYSTOLIC BLOOD PRESSURE: 91 MMHG | DIASTOLIC BLOOD PRESSURE: 52 MMHG | HEART RATE: 94 BPM | TEMPERATURE: 97.9 F | OXYGEN SATURATION: 100 % | RESPIRATION RATE: 16 BRPM

## 2018-06-08 VITALS
OXYGEN SATURATION: 98 % | SYSTOLIC BLOOD PRESSURE: 94 MMHG | DIASTOLIC BLOOD PRESSURE: 57 MMHG | TEMPERATURE: 98 F | RESPIRATION RATE: 16 BRPM | HEART RATE: 101 BPM

## 2018-06-08 NOTE — [email or web address]
Subjective


Chief Complaint:  Psychotic noncompliant medication night eating or drinking


Remarks


Patient is seen in her room with nurse Willy, chart reviewed, patient compliant 

medications today.  There was some confusion about getting consents for the 

Resporal.  That has been rectified patient took her first dose of Resporal at 

this time, we will take her following scheduled doses at at bedtime.  If all 

goes well we will consider the unit regular sustain on Monday





Review of Systems


Except as stated in HPI:  all other systems reviewed are Neg





Mental Status Examination


Appearance:  Disheveled


Consciousness:  Alert


Orientation:  Person, Place (At least)


Motor Activity:  Other (No abnormal motor movements noted)


Speech:  Unremarkable


Language:  Adequate


Fund of Knowledge:  Adequate


Attention and Concentration:  Adequate


Memory:  Unremarkable (Grossly intact on clinical exam)


Mood:  Other (Calm)


Affect:  Blunt


Thought Process & Associations:  Linear (Within delusional system)


Thought Content:  Delusional


Hallucination Type:  None


Delusion Type:  Other (Bahai)


Suicidal Ideation:  No (Denies SI but is injuring self by neglect)


Homicidal Ideation:  No (No HI voiced)


Insight:  Poor


Judgment:  Poor





Results


Vitals/IOs





Vital Signs








  Date Time  Temp Pulse Resp B/P (MAP) Pulse Ox O2 Delivery O2 Flow Rate FiO2


 


6/8/18 06:04 97.9 94 16 91/52 (65) 100   














Intake and Output   


 


 6/8/18 6/8/18 6/9/18





 08:00 16:00 00:00


 


Intake Total 0 ml  


 


Balance 0 ml  











Assessment & Plan


Problem List:  


(1) Schizophrenia


ICD Codes:  F20.9 - Schizophrenia, unspecified


Status:  Acute


Assessment & Plan


Estimated LOS:  days patient continues psychotic with vague voices though they 

are softening, patient showing compliance with her oral medication.  The 

patient continues consistent we will offer the Invega sustained on Monday with 

possible discharge on 24 hours


Justification for Cont. Inpt.


At this time patient would decompensate a place to the lower level of care


Discharge Planning


Return home


Request HC Surrog/Guard Advoc?:  Yes





Problem Qualifiers





(1) Schizophrenia:  


Qualified Codes:  F20.0 - Paranoid schizophrenia








Sean Aleman MD Jun 8, 2018 12:27

## 2018-06-09 VITALS
TEMPERATURE: 98.3 F | OXYGEN SATURATION: 95 % | HEART RATE: 104 BPM | DIASTOLIC BLOOD PRESSURE: 55 MMHG | SYSTOLIC BLOOD PRESSURE: 92 MMHG | RESPIRATION RATE: 16 BRPM

## 2018-06-09 VITALS
SYSTOLIC BLOOD PRESSURE: 96 MMHG | HEART RATE: 106 BPM | TEMPERATURE: 98.1 F | RESPIRATION RATE: 16 BRPM | OXYGEN SATURATION: 100 % | DIASTOLIC BLOOD PRESSURE: 61 MMHG

## 2018-06-09 VITALS
TEMPERATURE: 98.2 F | HEART RATE: 95 BPM | DIASTOLIC BLOOD PRESSURE: 63 MMHG | RESPIRATION RATE: 16 BRPM | SYSTOLIC BLOOD PRESSURE: 128 MMHG | OXYGEN SATURATION: 100 %

## 2018-06-09 RX ADMIN — ZIPRASIDONE MESYLATE PRN MG: 20 INJECTION, POWDER, LYOPHILIZED, FOR SOLUTION INTRAMUSCULAR at 10:06

## 2018-06-09 NOTE — HHI.PYPN
Subjective


Chief Complaint:  Psychotic noncompliant medication night eating or drinking


Remarks


Pt seen and discussed with staff. She refused oral medications and was given 

geodon IM.  She remains paranoid and suspicious. Pt told RN that she was 

refusing oral medications because she thought IM meds were a small amount. Pt 

is now agreeable to take oral risperidone.





Mental Status Examination


Appearance:  Disheveled


Consciousness:  Alert


Orientation:  Person, Place (At least)


Motor Activity:  Other (No abnormal motor movements noted)


Speech:  Unremarkable


Language:  Adequate


Fund of Knowledge:  Adequate


Attention and Concentration:  Adequate


Memory:  Unremarkable


Mood:  Other (Calm)


Affect:  Blunt


Thought Process & Associations:  Linear ( delusional system)


Thought Content:  Delusional


Hallucination Type:  None


Delusion Type:  Other (Jewish)


Suicidal Ideation:  No


Homicidal Ideation:  No


Insight:  Poor


Judgment:  Poor





Results


Vitals/IOs





Vital Signs








  Date Time  Temp Pulse Resp B/P (MAP) Pulse Ox O2 Delivery O2 Flow Rate FiO2


 


6/9/18 10:44 98.1 106 16 96/61 (73) 100   














Intake and Output   


 


 6/9/18 6/9/18 6/10/18





 08:00 16:00 00:00


 


Intake Total  0 ml 


 


Balance  0 ml 











Assessment & Plan


Problem List:  


(1) Schizophrenia


ICD Codes:  F20.9 - Schizophrenia, unspecified


Status:  Acute


Assessment & Plan


Continue current tx plan,.Encourage medication compliance. Clarification of 

orders completed. Estimated LOS:  days


Justification for Cont. Inpt.


risk of decompensation. psychosis


Request HC Surrog/Guard Advoc?:  Yes





Problem Qualifiers





(1) Schizophrenia:  


Qualified Codes:  F20.0 - Paranoid schizophrenia








Nicole Zaidi MD Jun 9, 2018 17:59

## 2018-06-10 VITALS
SYSTOLIC BLOOD PRESSURE: 81 MMHG | HEART RATE: 108 BPM | RESPIRATION RATE: 16 BRPM | OXYGEN SATURATION: 96 % | TEMPERATURE: 96.6 F | DIASTOLIC BLOOD PRESSURE: 58 MMHG

## 2018-06-10 VITALS
DIASTOLIC BLOOD PRESSURE: 63 MMHG | HEART RATE: 92 BPM | SYSTOLIC BLOOD PRESSURE: 109 MMHG | RESPIRATION RATE: 16 BRPM | OXYGEN SATURATION: 97 % | TEMPERATURE: 98.1 F

## 2018-06-10 VITALS — HEART RATE: 78 BPM | SYSTOLIC BLOOD PRESSURE: 82 MMHG | DIASTOLIC BLOOD PRESSURE: 52 MMHG

## 2018-06-10 NOTE — HHI.PYPN
Subjective


Chief Complaint:  Psychotic noncompliant medication night eating or drinking


Remarks


Pt seen and discussed with staff. She remains with delusions and some 

disorganization of thought process.  She has been seclusive to room but has 

come out for meals, but in afternoon she attended psychotherapy group today. No 

medication side effects. No SI/HI. Appetite is good and pt is eating all of 

meals per staff.





Mental Status Examination


Appearance:  Disheveled


Consciousness:  Alert


Orientation:  Person, Place, Date/Time


Motor Activity:  Other (No abnormal motor movements noted)


Speech:  Unremarkable


Language:  Adequate


Fund of Knowledge:  Adequate


Attention and Concentration:  Adequate


Memory:  Unremarkable


Mood:  Other (Calm)


Affect:  Blunt


Thought Process & Associations:  Loose associations, Linear ( delusional system)


Thought Content:  Delusional


Hallucination Type:  None


Delusion Type:  Other (Congregation)


Suicidal Ideation:  No


Homicidal Ideation:  No


Insight:  Poor


Judgment:  Poor





Results


Vitals/IOs





Vital Signs








  Date Time  Temp Pulse Resp B/P (MAP) Pulse Ox O2 Delivery O2 Flow Rate FiO2


 


6/10/18 05:45 98.1 92 16 109/63 (78) 97   











Assessment & Plan


Problem List:  


(1) Schizophrenia


ICD Codes:  F20.9 - Schizophrenia, unspecified


Status:  Acute


Assessment & Plan


Evening medications held due to hypotension (repeat manual BP). Pt is 

asymptomatic. Spoke with RN and instructed to push oral fluids. Will hold 

evening medications as pt reports past hypotension with risperidone. Review of 

chart shows pt had hypotension earlier in admission prompting need for IVF. It 

appears at that time, dehydration was culprit due to pt's refusal to eat or 

drink which does not seem to be the case now. Will monitor closely tonight and 

re-consult medicine for hypotension if persists.


Justification for Cont. Inpt.


psychosis, complicating medical conditions


Request HC Surrog/Guard Advoc?:  Yes





Problem Qualifiers





(1) Schizophrenia:  


Qualified Codes:  F20.0 - Paranoid schizophrenia








Nicole Zaidi MD Kvng 10, 2018 12:52

## 2018-06-11 VITALS — RESPIRATION RATE: 16 BRPM | HEART RATE: 98 BPM | DIASTOLIC BLOOD PRESSURE: 65 MMHG | SYSTOLIC BLOOD PRESSURE: 113 MMHG

## 2018-06-11 VITALS — DIASTOLIC BLOOD PRESSURE: 59 MMHG | SYSTOLIC BLOOD PRESSURE: 97 MMHG | RESPIRATION RATE: 18 BRPM | HEART RATE: 106 BPM

## 2018-06-11 VITALS
OXYGEN SATURATION: 99 % | DIASTOLIC BLOOD PRESSURE: 62 MMHG | HEART RATE: 90 BPM | SYSTOLIC BLOOD PRESSURE: 93 MMHG | RESPIRATION RATE: 16 BRPM | TEMPERATURE: 97.6 F

## 2018-06-11 VITALS
TEMPERATURE: 98 F | HEART RATE: 80 BPM | OXYGEN SATURATION: 99 % | RESPIRATION RATE: 16 BRPM | SYSTOLIC BLOOD PRESSURE: 102 MMHG | DIASTOLIC BLOOD PRESSURE: 62 MMHG

## 2018-06-11 VITALS
DIASTOLIC BLOOD PRESSURE: 60 MMHG | RESPIRATION RATE: 16 BRPM | OXYGEN SATURATION: 98 % | TEMPERATURE: 98 F | HEART RATE: 80 BPM | SYSTOLIC BLOOD PRESSURE: 98 MMHG

## 2018-06-11 VITALS — DIASTOLIC BLOOD PRESSURE: 50 MMHG | HEART RATE: 82 BPM | SYSTOLIC BLOOD PRESSURE: 88 MMHG

## 2018-06-11 NOTE — PD.TTN
Patient Problems


1. Discharge planning


2. Medication compliance


3. Knowledge deficit


4. Lack of coping skills





Progress Toward Goals


Provider Present:  Dr. MARY ANN Aleman


Provider Input:  


Dr. Aleman's treatment team met to discuss patient's treatment plan,


discharge, and medication. Patient is still psychotic but becoming softer.


Still adjusting medication.


6/11/18 Posible DC


Nurse(s) Input:  


Patient's nurse states, "not eating or drinking, paranoid, non compliant


with oral meds but will take shots


Psychiatric Counselors Present:  Smitha Wadsworth [facility]


Psych Therapist Input:  


Patient presented childlike, depressed, seclusive, affect flat. Patient's


speech was clear, organized. Patient reports not wanting to take oral


medication, but will take injections.


Group Spec/RT/OT/ROCK Present:  SHWETA Spencer


Group Spec/RT/OT/ROCK Input:  


Patient does not attend any groups


6/11/18- Pt. attends fresh air group











Jeet Blanco Jun 11, 2018 12:59

## 2018-06-11 NOTE — HHI.PYPN
Subjective


Chief Complaint:  Psychotic noncompliant medication night eating or drinking


Remarks


Patient seen in her room with medical student oriented, chart reviewed, patient'

s psychotropics have been held due to some low blood pressure.  However for 

this may be more a normal variant for her.  However we will restart the 

medications today at Resporal 2 mg at bedtime we will discontinue the other 

Resporal orders of discontinue the Geodon order.  The patient does well for 2 

doses on the Resporal we will consider adding the end vague at sustained a





Review of Systems


Except as stated in HPI:  all other systems reviewed are Neg





Mental Status Examination


Appearance:  Disheveled


Consciousness:  Alert


Orientation:  Person, Place, Date/Time


Motor Activity:  Other (No abnormal motor movements noted)


Speech:  Unremarkable


Language:  Adequate


Fund of Knowledge:  Adequate


Attention and Concentration:  Adequate


Memory:  Unremarkable


Mood:  Other (Calm)


Affect:  Blunt


Thought Process & Associations:  Loose associations, Linear ( delusional system)


Thought Content:  Delusional


Hallucination Type:  None


Delusion Type:  Other (Anabaptist)


Suicidal Ideation:  No


Homicidal Ideation:  No


Insight:  Poor


Judgment:  Poor





Results


Vitals/IOs





Vital Signs








  Date Time  Temp Pulse Resp B/P (MAP) Pulse Ox O2 Delivery O2 Flow Rate FiO2


 


6/11/18 14:00  98 16 113/65 (81)    


 


6/11/18 06:12 97.6    99   














Intake and Output   


 


 6/11/18 6/11/18 6/12/18





 08:00 16:00 00:00


 


Intake Total 0 ml  


 


Balance 0 ml  











Assessment & Plan


Problem List:  


(1) Schizophrenia


ICD Codes:  F20.9 - Schizophrenia, unspecified


Status:  Acute


Assessment & Plan


Estimated LOS:  days patient doing better psychiatrically, though she feels 

with the stoppage of the medication she is falling back somewhat we will 

restart the Resporal it 2 mg at at bedtime consider the Invega sustained him to 

days


Justification for Cont. Inpt.


At this time patient would decompensate a place to the lower level of care


Discharge Planning


Return home to family


Request HC Surrog/Guard Advoc?:  Yes





Problem Qualifiers





(1) Schizophrenia:  


Qualified Codes:  F20.0 - Paranoid schizophrenia








Sean Aleman MD Jun 11, 2018 15:44

## 2018-06-12 VITALS
RESPIRATION RATE: 18 BRPM | SYSTOLIC BLOOD PRESSURE: 103 MMHG | TEMPERATURE: 98.1 F | DIASTOLIC BLOOD PRESSURE: 64 MMHG | OXYGEN SATURATION: 98 %

## 2018-06-12 VITALS
OXYGEN SATURATION: 99 % | DIASTOLIC BLOOD PRESSURE: 55 MMHG | RESPIRATION RATE: 17 BRPM | SYSTOLIC BLOOD PRESSURE: 114 MMHG | HEART RATE: 93 BPM | TEMPERATURE: 98 F

## 2018-06-12 VITALS — SYSTOLIC BLOOD PRESSURE: 103 MMHG | HEART RATE: 104 BPM | DIASTOLIC BLOOD PRESSURE: 64 MMHG | RESPIRATION RATE: 16 BRPM

## 2018-06-12 VITALS
TEMPERATURE: 98.6 F | RESPIRATION RATE: 20 BRPM | SYSTOLIC BLOOD PRESSURE: 96 MMHG | OXYGEN SATURATION: 98 % | DIASTOLIC BLOOD PRESSURE: 44 MMHG | HEART RATE: 88 BPM

## 2018-06-12 VITALS
SYSTOLIC BLOOD PRESSURE: 88 MMHG | OXYGEN SATURATION: 99 % | RESPIRATION RATE: 14 BRPM | TEMPERATURE: 98.4 F | DIASTOLIC BLOOD PRESSURE: 54 MMHG | HEART RATE: 116 BPM

## 2018-06-12 RX ADMIN — OXYCODONE HYDROCHLORIDE AND ACETAMINOPHEN PRN TAB: 10; 325 TABLET ORAL at 21:41

## 2018-06-12 NOTE — HHI.PYPN
Subjective


Chief Complaint:  Psychotic noncompliant medication night eating or drinking


Remarks


Patient seen in her room with medical student faviola chart review, patient 

compliant medication.  Patient did take her oral Resporal last night with no 

problems or side effects.  Patient does well tonight will offer her the Invega 

sustained at tomorrow consider discharge tomorrow also.  Patient states she had 

visits from her children that were quite positive she is planning on moving 

towards [place] to live with her children.  She now denies suicidality 

homicidality voices or visions.  Overall paranoia has gone the vigilance is 

gone her affect is improving





Review of Systems


Except as stated in HPI:  all other systems reviewed are Neg





Mental Status Examination


Appearance:  Disheveled


Consciousness:  Alert


Orientation:  Person, Place, Date/Time


Motor Activity:  Other (No abnormal motor movements noted)


Speech:  Unremarkable


Language:  Adequate


Fund of Knowledge:  Adequate


Attention and Concentration:  Adequate


Memory:  Unremarkable


Mood:  Other (Calm)


Affect:  Blunt


Thought Process & Associations:  Loose associations, Linear ( delusional system)


Thought Content:  Delusional


Hallucination Type:  None


Delusion Type:  Other (Yazidism)


Suicidal Ideation:  No


Homicidal Ideation:  No


Insight:  Poor


Judgment:  Poor





Results


Vitals/IOs





Vital Signs








  Date Time  Temp Pulse Resp B/P (MAP) Pulse Ox O2 Delivery O2 Flow Rate FiO2


 


6/12/18 14:00  104 16 103/64 (77)    


 


6/12/18 10:40 98.4    99   














Intake and Output   


 


 6/12/18 6/12/18 6/13/18





 08:00 16:00 00:00


 


Intake Total 0 ml  


 


Balance 0 ml  











Assessment & Plan


Problem List:  


(1) Schizophrenia


ICD Codes:  F20.9 - Schizophrenia, unspecified


Status:  Acute


Assessment & Plan


Estimated LOS:  days patient continues to improve now denying suicidality 

homicidality voice or visions.  Compliant medications.  Is due for the Invega 

sustained a injection tomorrow


Justification for Cont. Inpt.


At this time patient would decompensate if not placed in an appropriate level 

of care


Discharge Planning


Probable discharge tomorrow to family


Request HC Surrog/Guard Advoc?:  Yes





Problem Qualifiers





(1) Schizophrenia:  


Qualified Codes:  F20.0 - Paranoid schizophrenia








Sean Aleman MD Jun 12, 2018 14:30

## 2018-06-13 VITALS — SYSTOLIC BLOOD PRESSURE: 95 MMHG | HEART RATE: 66 BPM | DIASTOLIC BLOOD PRESSURE: 55 MMHG

## 2018-06-13 VITALS
SYSTOLIC BLOOD PRESSURE: 91 MMHG | OXYGEN SATURATION: 96 % | RESPIRATION RATE: 20 BRPM | HEART RATE: 94 BPM | DIASTOLIC BLOOD PRESSURE: 58 MMHG | TEMPERATURE: 98 F

## 2018-06-13 RX ADMIN — OXYCODONE HYDROCHLORIDE AND ACETAMINOPHEN PRN TAB: 10; 325 TABLET ORAL at 08:56

## 2018-06-13 NOTE — HHI.DS
Psychiatry Discharge Summary


Inpatient Psychiatric care?:  Yes


Advance Directive:  No


Reason Not Provided:  LACKS CAPACITY


Mental Health AdvanceDirective:  No


Health Care Proxy:  No


Admission


Admission Date


May 24, 2018 at 18:55


Admission Diagnosis:  


(1) Schizophrenia


ICD Code:  F20.9 - Schizophrenia, unspecified


Brief History


Patient is 58-year-old white female who comes for under a Camarena act by the 

[place] [employer] dated 5/24/18 at 1440 8 PM that document 

reviewed essentially stating diagnosed schizophrenic manic depressive according 

to his sister has not eaten or slept in a week attempted to speak with her she 

was making nonsensical statements in an unknown language is a history of mental 

illness and psych episodes officers were unable to obtain any information from 

the patient due to her mental state.  Patient seen screen in the ED urine 

toxicology positive for benzodiazepines and negative for alcohol.  At the 

present time patient sitting in a chair in her room counselor sonia present 

throughout session patient sitting in the chair in her room with a sheet 

covering her from her feet up to her neck.  She is babbling nonsensical phrases 

such as nanana  abebaa naba then she stated I will talk to you went back to her 

nonsensical syllables.  Further questioning elicited nothing more than those 

statements.  I then called patient's daughter Ms. Rachel Holloway in [phone].  She 

gave history of her mother suffering mental illness for many years with 

multiple psychiatric hospitalizations [place].  Mother has a history of chronic 

noncompliance medication denial of illness.  There is also been some misuse of 

pain medication.  There is also been a history of poor appetite with minimal 

oral intake both solids and fluids due to her paranoia and no delusions.  But 

is vague about any past history of physical or sexual abuse.  There is some 

vague history of misuse of pain medications.  Of interest the patient was 

incarcerated for a number of years.  Patient's  who is now incarcerated 

will be getting out of assisted around the end of the year.  Patient's 2 youngest 

children have joined the  within the past year also.  These episodes 

may have also been stressful for the patient.  Patient has been living with her 

daughter recently.  The daughter confirms O not complaints medication in the 

multiple psychiatric hospitalizations.  Daughter is willing to be healthcare 

surrogate/guardian advocate.  At the present time patient meets criteria for 

involuntary psychiatric hospitalization of the Camarena act L the first opinion 

request second opinion I feel she does not have capacity thus I will ask for 

health care surrogate and guardian advocate.  I have also done of the med 

reconciliation than the initial psychiatric template admission orders.  We will 

have a hospitalist consult will S we will have PT and OT consult will S.  We 

will start patient on Geodon 40 mg p.o. twice daily and if she refuses that 10 

mg Geodon IM in its place we will discontinue the Seroquel and the Paxil and 

the other psychotropics at this time


Tobacco Use In Past 30 Days:  4 or Less Cigarettes/Day


Alcohol Use:  Never


Hospital Course


Patient's hospital course initially was somewhat chaotic with patient refusing 

all oral medications.  However she tolerated and did not refuse or resist the 

IM Geodon.  Her psychosis and delusions slowly resolved to the point where she 

became willing to take oral Resporal.  Patient tolerated that medication for a 

few days.  Yesterday patient received her invega sustena 234 mg intramuscular.  

Patient alert oriented calm cooperative deny suicidality homicidality voice or 

visions.  Her paranoia and resistance to medication has essentially resolved.  

She is showing some insight into her recovery.  She is willing to take the 

Invega sustained them monthly.  She is planning on living near the [facility] 

with her family and going to her own private psychiatrist in that area thus at 

this time patient no longer meets Baker criteria will lift Camarena act patient to 

be discharged.  We will discontinue the oral Resporal and continue the Invega 

sustained along with a medical medications.





Results


Blood Pressure


95 / 55





Vital Signs








  Date Time  Temp Pulse Resp B/P (MAP) Pulse Ox O2 Delivery O2 Flow Rate FiO2


 


6/13/18 08:00  66  95/55 (68)    


 


6/13/18 06:15 98.0  20  96   











 Laboratory Results








Test


  5/28/18


01:15 5/29/18


05:20


 


Cholesterol Level


  276 MG/DL


(120-200) 


 


 


HDL Cholesterol


  56.6 MG/DL


(40.0-60.0) 


 


 


LDL Cholesterol


  203 MG/DL


(0-99) 


 


 


Triglycerides Level


  82 MG/DL


() 


 


 


Hemoglobin A1c


  


  5.3 %


(4.3-6.0)








Summary of Procedures


None done


Imaging





Last Impressions








Head CT 5/27/18 0000 Signed





Impressions: 





 CONCLUSION:





 1.  Negative CT Head non contrast.





 2.  Stable exam without evidence of acute infarct, hemorrhage, mass or edema.





  





 








Pending results at discharge:  No





Medications


# of Antipsychotic meds at D/C:  1


Approp Antipsych med options


1 - Minimum of three failed multiple trials of monotherapy.


2 - Documented plan to taper to monotherapy due to previous use of multiple 

meds OR cross-taper in progress at D/C.


3 - Documentation of augmentation of Clozapine.


4 - Justification other than those listed in allowable values 1-3, document here

:





Discharge


Discharge Date:  Jun 13, 2018


Discharge Diagnosis:  


(1) Schizophrenia


ICD Code:  F20.9 - Schizophrenia, unspecified


Status:  Acute


Pt Condition on Discharge:  Stable


Discharge Disposition:  Discharge Home





Discharge Instructions


Diet Instructions:  As Tolerated, No Restrictions


Activities you can perform:  Regular-No Restrictions


Scheduled Appointment:  Private Psychiatrist (in [facility])





Discharge Time


> 30 minutes





Mental Status Examination


Appearance:  Disheveled


Consciousness:  Alert


Orientation:  Person, Place, Date/Time


Motor Activity:  Other (No abnormal motor movements noted)


Speech:  Unremarkable


Language:  Adequate


Fund of Knowledge:  Adequate


Attention and Concentration:  Adequate


Memory:  Unremarkable


Mood:  Other (Calm)


Affect:  Blunt


Thought Process & Associations:  Loose associations, Linear ( delusional system)


Thought Content:  Delusional


Hallucination Type:  None


Delusion Type:  Other (Buddhist)


Suicidal Ideation:  No


Homicidal Ideation:  No


Insight:  Poor


Judgment:  Poor





Discharge/Advance Care Plan


Health Problems:  


(1) Schizophrenia


Goals to promote your health


* To prevent worsening of your condition and complications


* To maintain your health at the optimal level


Directions to meet your goals


*** Take your medications as prescribed


***  Follow your dietary instruction


***  Follow activity as directed





***  Keep your appointments as scheduled


***  Take your immunizations and boosters as scheduled


***  If your symptoms worsen call your PCP, if no PCP go to Urgent Care Center 

or Emergency Room ***


***  For 24/7 questions related to your inpatient stay or results of tests 

pending at discharge, please contact Dr. Sean Aleman at [phone]


***  Smoking is Dangerous to Your Health. Avoid second hand smoking ***





Problem Qualifiers





(1) Schizophrenia:  


Qualified Codes:  F20.0 - Paranoid schizophrenia








Sean Aleman MD Jun 13, 2018 14:55

## 2024-02-04 NOTE — PD.PSY.CON
Provisional Diagnosis


Admission Date


May 24, 2018 at 18:55


Axis I.


Schizophrenia chronic paranoid type F 20.0





History of Present Illness


Service


Psychiatry


Consult Requested By


Psychiatry


Reason for Consult


Second opinion


Primary Care Physician


Unknown


HPI


Patient is 58-year-old white female who comes for under a Camarena act by the 

[place] [employer] dated 5/24/18 at 1440 8 PM that document 

reviewed essentially stating diagnosed schizophrenic manic depressive according 

to his sister has not eaten or slept in a week attempted to speak with her she 

was making nonsensical statements in an unknown language is a history of mental 

illness and psych episodes officers were unable to obtain any information from 

the patient due to her mental state.  Patient seen screen in the ED urine 

toxicology positive for benzodiazepines and negative for alcohol.  At the 

present time patient sitting in a chair in her room counselor sonia present 

throughout session patient sitting in the chair in her room with a sheet 

covering her from her feet up to her neck.  She is babbling nonsensical phrases 

such as nanana  baba naba then she stated I will talk to you went back to her 

nonsensical syllables.  Further questioning elicited nothing more than those 

statements.  I then called patient's daughter Ms. Rachel Holloway in [phone].  She 

gave history of her mother suffering mental illness for many years with 

multiple psychiatric hospitalizations [place] area.  Mother has a history of chronic 

noncompliance medication denial of illness.  There is also been some misuse of 

pain medication.  There is also been a history of poor appetite with minimal 

oral intake both solids and fluids due to her paranoia and no delusions.  But 

is vague about any past history of physical or sexual abuse.  There is some 

vague history of misuse of pain medications.  Of interest the patient was 

incarcerated for a number of years.  Patient's  who is now incarcerated 

will be getting out of longterm around the end of the year.  Patient's 2 youngest 

children have joined the  within the past year also.  These episodes 

may have also been stressful for the patient.  Patient has been living with her 

daughter recently.  The daughter confirms O not complaints medication in the 

multiple psychiatric hospitalizations.  Daughter is willing to be healthcare 

surrogate/guardian advocate.  At the present time patient meets criteria for 

involuntary psychiatric hospitalization of the Camarena act L the first opinion 

request second opinion I feel she does not have capacity thus I will ask for 

health care surrogate and guardian advocate.  I have also done of the med 

reconciliation than the initial psychiatric template admission orders.  We will 

have a hospitalist consult will S we will have PT and OT consult will S.  We 

will start patient on Geodon 40 mg p.o. twice daily and if she refuses that 10 

mg Geodon IM in its place we will discontinue the Seroquel and the Paxil and 

the other psychotropics at this time





Past Family Social History


Coded Allergies:  


     Sulfa (Sulfonamide Antibiotics) (Unverified  Allergy, Severe, 3/11/18)


 NAUSEA/VOMITING/SOB


     haloperidol (Verified  Allergy, Severe, 5/24/18)


     iodine (Unverified  Allergy, Severe, Swelling, 3/11/18)


 SOB


     potassium iodide (Unverified  Allergy, Severe, Swelling, 3/11/18)


 SOB


     povidone-iodine (Unverified  Allergy, Severe, Swelling, 3/11/18)


 SOB


     sodium iodide (Unverified  Allergy, Severe, Swelling, 3/11/18)


 SOB


     sodium iodide (Unverified  Allergy, Severe, Swelling, 3/11/18)


 SOB


Reported Medications


Atenolol (Atenolol) 25 Mg Tab, 25 MG PO DAILY for Blood Pressure Management, #

30 TAB


   3/11/18


Alprazolam (Alprazolam) 1 Mg Tab, 1 MG PO TID Y for ANXIETY, TAB 0 Refills


   3/11/18


Carisoprodol (Carisoprodol) 250 Mg Tab, 350 MG PO BID Y for PAIN, TAB 0 Refills


   3/11/18


Oxycodone-Acetaminophen (Oxycodone-Acetaminophen)  mg Tab, 1 TAB PO Q6H Y 

for PAIN, #60 TAB 0 Refills


   3/11/18


Discontinued Reported Medications


Quetiapine (Seroquel) 25 Mg Tab, 25 MG PO HS, #30 TAB 0 Refills


   3/11/18


Paroxetine (Paxil) 10 Mg Tab, 20 MG PO DAILY, #30 TAB 0 Refills


   3/11/18


Methylphenidate IR (Ritalin IR) 10 Mg Tab, 10 MG PO BIDAC, #60 TAB 0 Refills


   3/11/18


Discontinued Scripts


Nitrofurantoin Monohydrate Macrocrystals (Macrobid) 100 Mg Cap, 100 MG PO BID 

for Infection for 7 Days, #14 CAP 0 Refills


   Prov:Rolly Simpson MD         3/11/18





Current Medications








 Medications


  (Trade)  Dose


 Ordered  Sig/Judit


 Route  Start Time


 Stop Time Status Last Admin


 


  (Milk Of


 Magnesia Liq)  30 ml  DAILY  PRN


 PO  5/24/18 19:00


     


 


 


  (Mag-Al Plus


 Susp Liq)  30 ml  Q6H  PRN


 PO  5/24/18 19:00


     


 


 


  (Tenormin)  25 mg  DAILY


 PO  5/25/18 09:00


     


 


 


  (Geodon)  40 mg  BIDPC


 PO  5/25/18 12:30


     


 


 


  (Geodon Inj)  10 mg  BIDPC  PRN


 IM  5/25/18 12:30


    5/26/18 11:04


 


 


  (Benadryl)  50 mg  HS  PRN


 PO  5/25/18 12:30


     


 


 


  (Tylenol)  650 mg  Q4H  PRN


 PO  5/25/18 12:30


     


 


 


  (Atarax)  50 mg  Q6H  PRN


 PO  5/25/18 12:30


     


 


 


  (Percocet 


 Mg)  1 tab  Q6H  PRN


 PO  5/25/18 12:30


     


 








Patient's Strengths (min. 2)


Patient support of family able access healthcare





Physical Exam


Vital Signs





Vital Signs








  Date Time  Temp Pulse Resp B/P (MAP) Pulse Ox O2 Delivery O2 Flow Rate FiO2


 


5/24/18 20:45 97.9 131 18 117/81 (93) 98   


 


5/24/18 17:24      Room Air  











Mental Status Examination


Appearance:  Disheveled


Consciousness:  Alert


Motor Activity:  Other (Patient sitting in chair unable to ascertain)


Speech:  Rapid, Incoherent


Language:  Perseveration


Fund of Knowledge:  Inadequate


Attention and Concentration:  Inadequate


Memory:  Impaired


Mood:  Irritable, Manic


Affect:  Other (Slight increased range and intensity)


Thought Process & Associations:  Other (Difficult to ascertain due to patient's 

perseveration)


Thought Content:  Bizarre thinking, Ideas of reference


Hallucination Type:  Other (Difficult to ascertain due to patient's psychosis)


Delusion Type:  Paranoid


Suicidal Ideation:  No (Difficult to ascertain due to patient's psychosis)


Suicidal Plan:  No (Difficult to ascertain due to patient's psychosis)


Suicidal Intention:  No (Difficult to ascertain due to patient's psychosis)


Homicidal Ideation:  No (If called to ascertain due to patient's psychosis)


Homicidal Plan:  No


Homicidal Intention:  No


Insight:  Poor


Judgment:  Poor





Assessment & Plan


Problem List:  


(1) Schizophrenia


ICD Codes:  F20.9 - Schizophrenia, unspecified


Status:  Acute


Assessment & Plan:  I have seen and examined this patient, I agree and concur 

with Dr. Aleman's assessment and plan.  Consult appreciated





Assessment & Plan


Estimated LOS:  days


Request HC Surrog/Guard Advoc?:  Yes





Problem Qualifiers





(1) Schizophrenia:  


Qualified Codes:  F20.0 - Paranoid schizophrenia








Sony Wilkerson MD May 26, 2018 16:21 Rosalinda Mcallister is a 19 year old female presenting to the walk-in clinic today alone for congestion, headache, and sore on tongue. All symptoms started 4 days ago.    Treatment tried prior to visit: Nothing today    Swabs/Specimens collected during rooming process:  COVID/FLU/RSV - rapid and Strep PCR    Work, School or  note needed: No    New vs Established: Established    Patient would like communication of their results via:    [employer]    Cell Phone:   Telephone Information:   Mobile [phone]     Okay to leave a message containing results? Yes